# Patient Record
Sex: FEMALE | Race: OTHER | HISPANIC OR LATINO | Employment: FULL TIME | URBAN - METROPOLITAN AREA
[De-identification: names, ages, dates, MRNs, and addresses within clinical notes are randomized per-mention and may not be internally consistent; named-entity substitution may affect disease eponyms.]

---

## 2018-10-18 ENCOUNTER — EVALUATION (OUTPATIENT)
Dept: PHYSICAL THERAPY | Facility: CLINIC | Age: 43
End: 2018-10-18
Payer: COMMERCIAL

## 2018-10-18 ENCOUNTER — TRANSCRIBE ORDERS (OUTPATIENT)
Dept: PHYSICAL THERAPY | Facility: CLINIC | Age: 43
End: 2018-10-18

## 2018-10-18 DIAGNOSIS — R60.9 PITTING EDEMA: ICD-10-CM

## 2018-10-18 DIAGNOSIS — S83.512D RUPTURE OF ANTERIOR CRUCIATE LIGAMENT OF LEFT KNEE, SUBSEQUENT ENCOUNTER: Primary | ICD-10-CM

## 2018-10-18 PROCEDURE — 97110 THERAPEUTIC EXERCISES: CPT | Performed by: PHYSICAL THERAPIST

## 2018-10-18 PROCEDURE — G8991 OTHER PT/OT GOAL STATUS: HCPCS | Performed by: PHYSICAL THERAPIST

## 2018-10-18 PROCEDURE — 97014 ELECTRIC STIMULATION THERAPY: CPT | Performed by: PHYSICAL THERAPIST

## 2018-10-18 PROCEDURE — G0283 ELEC STIM OTHER THAN WOUND: HCPCS | Performed by: PHYSICAL THERAPIST

## 2018-10-18 PROCEDURE — G8990 OTHER PT/OT CURRENT STATUS: HCPCS | Performed by: PHYSICAL THERAPIST

## 2018-10-18 PROCEDURE — 97161 PT EVAL LOW COMPLEX 20 MIN: CPT | Performed by: PHYSICAL THERAPIST

## 2018-10-18 RX ORDER — CETIRIZINE HYDROCHLORIDE 10 MG/1
10 TABLET, CHEWABLE ORAL AS NEEDED
COMMUNITY
End: 2022-01-28 | Stop reason: ALTCHOICE

## 2018-10-18 NOTE — PROGRESS NOTES
PT Evaluation     Today's date: 10/18/2018  Patient name: Bharat Muro  : 1975  MRN: [id number]  Referring provider: Sebastián Lu DO  Dx:   Encounter Diagnosis     ICD-10-CM    1  Rupture of anterior cruciate ligament of left knee, subsequent encounter S83 512D    2  Pitting edema R60 9        Start Time: 0700  Stop Time: 0800  Total time in clinic (min): 60 minutes    Assessment  Impairments: abnormal coordination, abnormal gait, abnormal muscle firing, abnormal or restricted ROM, abnormal movement, activity intolerance, impaired balance, impaired physical strength, lacks appropriate home exercise program, pain with function and weight-bearing intolerance    Assessment details: Patient is a 39 y o  female who presents with the above listed impairments  Patient would benefit from skilled PT services to address these impairments and to maximize function  Thank you for the referral     Understanding of Dx/Px/POC: good   Prognosis: good    Goals  Impairment Goals  - Decrease pain by 50% in 2 weeks  - Increase left knee ROM to WNL 2 weeks  - Increase left lower extremity strength golbally to 4/5 in 6 weeks  Functional Goals  - Return to Prior Level of Function in 8 weeks  - Will be able to ambulate without crutches with normalized gait pattern in 2 weeks    - Patient will be independent with HEP in 8 weeks    Plan  Patient would benefit from: skilled PT  Planned modality interventions: cryotherapy  Planned therapy interventions: joint mobilization, manual therapy, neuromuscular re-education, patient education, strengthening, stretching, therapeutic activities, therapeutic exercise, home exercise program, functional ROM exercises, Palma taping and postural training  Frequency: 2x week (2-3x week)  Duration in weeks: 8  Treatment plan discussed with: patient        Subjective Evaluation    History of Present Illness  Mechanism of injury: Patient reports on 10/2/18 she feel of a counter height surface and landed on her L knee  She noted a "tearing" sensation  She recently had an MRI which demonstrated an near complete ACL tear  Pt notes she is using crutches and has been NWB  She recent just started with PWB with B axillary crutches as she has been instructed by her ortho no avoid full WB until she has a brace as this could cause more damage  She has also been asked to have "ACL baseline testing"  Which includes isokinetic testing, KT 1000, and functional testing  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[address]  PLOF: Independent  Pain  Current pain ratin  At best pain ratin  At worst pain ratin  Location: L knee    Patient Goals  Patient goals for therapy: decreased pain, independence with ADLs/IADLs, return to sport/leisure activities, increased motion, increased strength, decreased edema and improved balance          Objective     Observations   Left Knee   Positive for edema and effusion  Tenderness   Left Knee   Tenderness in the ITB, lateral joint line, lateral retinaculum, LCL (distal), LCL (proximal), MCL (distal), MCL (proximal), medial joint line, patellar tendon and quadriceps tendon  Active Range of Motion   Left Knee   Flexion: 90 degrees   Extension: 0 degrees     Right Knee   Flexion: 138 degrees   Extension: 0 degrees     Passive Range of Motion   Left Knee   Flexion: 100 degrees   Extension: 0 degrees     Strength/Myotome Testing     Additional Strength Details  Fair quad set with a 10 degree quad lag noted with a SLR  Tests     Left Knee   Positive anterior Lachman  Additional Tests Details        Swelling     Left Knee Girth Measurement (cm)   Joint line: 39 cm  10 cm above joint line: 42 cm  10 cm below joint line: 35 cm    Right Knee Girth Measurement (cm)   Joint line: 39 cm  10 cm above joint line: 44 cm  10 cm below joint line: 35 cm    General Comments     Knee Comments  Neurovascular intact    Gait was antalgic in nature with B axillary crutches        Flowsheet Rows      Most Recent Value   PT/OT G-Codes   Current Score  28   Projected Score  58   FOTO information reviewed  Yes   Assessment Type  Evaluation   G code set  Other PT/OT Primary   Other PT Primary Current Status ()  CL   Other PT Primary Goal Status ()  CH          Daily Treatment Diary   Diagnosis: L ACL tear   Precautions: NA   Manuals 10/17       PROM                                Exercise Diary        RBC                QS :05x10       SLR flexion AAROM x5       SLR abd        SLR add        SLR ext        Knee flexion S :10x10       Sitting HS curls        HS S                Biodex weight shift        Calf S                                                           Gait             Modalities             CP and IFC PRN 10 min        Gameready

## 2018-10-18 NOTE — LETTER
2018    Porter Chandra DO  [address] [facility]    Patient: Hunter Sal   YOB: 1975   Date of Visit: 10/18/2018     Encounter Diagnosis     ICD-10-CM    1  Rupture of anterior cruciate ligament of left knee, subsequent encounter S83 512D    2  Pitting edema R60 9        Dear Dr Lesa Severs:    Please review the attached Plan of Care from [address] [place] [address]'s recent visit  Please verify that you agree therapy should continue by signing the attached document and sending it back to our office  If you have any questions or concerns, please don't hesitate to call  Sincerely,    Catia John, PT      Referring Provider:      I certify that I have read the below Plan of Care and certify the need for these services furnished under this plan of treatment while under my care  Porter Chandra   [address] [address]  VIA Facsimile: [phone]          PT Evaluation     Today's date: 10/18/2018  Patient name: Hunter Sal  : 1975  MRN: [id number]  Referring provider: Monica Feldman DO  Dx:   Encounter Diagnosis     ICD-10-CM    1  Rupture of anterior cruciate ligament of left knee, subsequent encounter S83 512D    2  Pitting edema R60 9        Start Time: 0700  Stop Time: 0800  Total time in clinic (min): 60 minutes    Assessment  Impairments: abnormal coordination, abnormal gait, abnormal muscle firing, abnormal or restricted ROM, abnormal movement, activity intolerance, impaired balance, impaired physical strength, lacks appropriate home exercise program, pain with function and weight-bearing intolerance    Assessment details: Patient is a 39 y o  female who presents with the above listed impairments  Patient would benefit from skilled PT services to address these impairments and to maximize function    Thank you for the referral     Understanding of Dx/Px/POC: good   Prognosis: good    Goals  Impairment Goals  - Decrease pain by 50% in 2 weeks  - Increase left knee ROM to WNL 2 weeks  - Increase left lower extremity strength golbally to 4/5 in 6 weeks  Functional Goals  - Return to Prior Level of Function in 8 weeks  - Will be able to ambulate without crutches with normalized gait pattern in 2 weeks  - Patient will be independent with HEP in 8 weeks    Plan  Patient would benefit from: skilled PT  Planned modality interventions: cryotherapy  Planned therapy interventions: joint mobilization, manual therapy, neuromuscular re-education, patient education, strengthening, stretching, therapeutic activities, therapeutic exercise, home exercise program, functional ROM exercises, Palma taping and postural training  Frequency: 2x week (2-3x week)  Duration in weeks: 8  Treatment plan discussed with: patient        Subjective Evaluation    History of Present Illness  Mechanism of injury: Patient reports on 10/2/18 she feel of a counter height surface and landed on her L knee  She noted a "tearing" sensation  She recently had an MRI which demonstrated an near complete ACL tear  Pt notes she is using crutches and has been NWB  She recent just started with PWB with B axillary crutches as she has been instructed by her ortho no avoid full WB until she has a brace as this could cause more damage  She has also been asked to have "ACL baseline testing"  Which includes isokinetic testing, KT 1000, and functional testing  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  Pain  Current pain ratin  At best pain ratin  At worst pain ratin  Location: L knee    Patient Goals  Patient goals for therapy: decreased pain, independence with ADLs/IADLs, return to sport/leisure activities, increased motion, increased strength, decreased edema and improved balance          Objective     Observations   Left Knee   Positive for edema and effusion       Tenderness   Left Knee   Tenderness in the ITB, lateral joint line, lateral retinaculum, LCL (distal), LCL (proximal), MCL (distal), MCL (proximal), medial joint line, patellar tendon and quadriceps tendon  Active Range of Motion   Left Knee   Flexion: 90 degrees   Extension: 0 degrees     Right Knee   Flexion: 138 degrees   Extension: 0 degrees     Passive Range of Motion   Left Knee   Flexion: 100 degrees   Extension: 0 degrees     Strength/Myotome Testing     Additional Strength Details  Fair quad set with a 10 degree quad lag noted with a SLR  Tests     Left Knee   Positive anterior Lachman  Additional Tests Details        Swelling     Left Knee Girth Measurement (cm)   Joint line: 39 cm  10 cm above joint line: 42 cm  10 cm below joint line: 35 cm    Right Knee Girth Measurement (cm)   Joint line: 39 cm  10 cm above joint line: 44 cm  10 cm below joint line: 35 cm    General Comments     Knee Comments  Neurovascular intact  Gait was antalgic in nature with B axillary crutches        Flowsheet Rows      Most Recent Value   PT/OT G-Codes   Current Score  28   Projected Score  58   FOTO information reviewed  Yes   Assessment Type  Evaluation   G code set  Other PT/OT Primary   Other PT Primary Current Status ()  CL   Other PT Primary Goal Status ()  CH          Daily Treatment Diary   Diagnosis: L ACL tear   Precautions: NA   Manuals 10/17       PROM                                Exercise Diary        RBC                QS :05x10       SLR flexion AAROM x5       SLR abd        SLR add        SLR ext        Knee flexion S :10x10       Sitting HS curls        HS S                Biodex weight shift        Calf S                                                           Gait             Modalities             CP and IFC PRN 10 min        Gameready

## 2018-10-22 ENCOUNTER — OFFICE VISIT (OUTPATIENT)
Dept: PHYSICAL THERAPY | Facility: CLINIC | Age: 43
End: 2018-10-22
Payer: COMMERCIAL

## 2018-10-22 DIAGNOSIS — S83.512D RUPTURE OF ANTERIOR CRUCIATE LIGAMENT OF LEFT KNEE, SUBSEQUENT ENCOUNTER: Primary | ICD-10-CM

## 2018-10-22 PROCEDURE — 97010 HOT OR COLD PACKS THERAPY: CPT

## 2018-10-22 PROCEDURE — 97110 THERAPEUTIC EXERCISES: CPT

## 2018-10-22 PROCEDURE — 97112 NEUROMUSCULAR REEDUCATION: CPT

## 2018-10-22 PROCEDURE — 97140 MANUAL THERAPY 1/> REGIONS: CPT

## 2018-10-22 NOTE — PROGRESS NOTES
Daily Note     Today's date: 10/22/2018  Patient name: Stefan Green  : 1975  MRN: [id number]  Referring provider: Madelin Opitz, DO  Dx:   Encounter Diagnosis     ICD-10-CM    1  Rupture of anterior cruciate ligament of left knee, subsequent encounter S83 512D                   Subjective: Patient reports 2/10 pain upon arrival  She states she has been attempting to ambulate at home and stair climb with bilateral hand rails  She states she has about 7-8/10 pain at the end of the day  She has been complaint with HEP  Objective: See treatment diary below  Diagnosis: L ACL tear   Precautions: NA   Manuals 10/17 10/22        PROM   KLS                                                    Exercise Diary             RBC   5 min ROM                       QS :05x10 :05x10          SLR flexion AAROM x5 AROM x10         SLR abd   10x          SLR add            SLR ext   10x          Knee flexion S :10x10 :10x10         Sitting HS curls             HS S   :20x5                       Biodex weight shift   3 min          Calf S                                                                      Gait   One rail 5 min         Modalities             CP and IFC PRN 10 min CP only 10 min          Gameready                                 Assessment: Initiated outlined program  Patient demosntrates improved quad set; difficulty maintain full extension due to weakness and pain  She was able to perform weight shifting on Biodex and hold for several seconds each time with equal WBing  Instructed on heel strike for proper gait  Assess nv  Updated HEP  Plan: Progress treatment as tolerated

## 2018-10-25 ENCOUNTER — OFFICE VISIT (OUTPATIENT)
Dept: PHYSICAL THERAPY | Facility: CLINIC | Age: 43
End: 2018-10-25
Payer: COMMERCIAL

## 2018-10-25 DIAGNOSIS — R60.9 PITTING EDEMA: ICD-10-CM

## 2018-10-25 DIAGNOSIS — S83.512D RUPTURE OF ANTERIOR CRUCIATE LIGAMENT OF LEFT KNEE, SUBSEQUENT ENCOUNTER: Primary | ICD-10-CM

## 2018-10-25 PROCEDURE — 97110 THERAPEUTIC EXERCISES: CPT | Performed by: PHYSICAL THERAPIST

## 2018-10-25 PROCEDURE — 97140 MANUAL THERAPY 1/> REGIONS: CPT | Performed by: PHYSICAL THERAPIST

## 2018-10-25 PROCEDURE — 97112 NEUROMUSCULAR REEDUCATION: CPT | Performed by: PHYSICAL THERAPIST

## 2018-10-29 ENCOUNTER — OFFICE VISIT (OUTPATIENT)
Dept: PHYSICAL THERAPY | Facility: CLINIC | Age: 43
End: 2018-10-29
Payer: COMMERCIAL

## 2018-10-29 DIAGNOSIS — S83.512D RUPTURE OF ANTERIOR CRUCIATE LIGAMENT OF LEFT KNEE, SUBSEQUENT ENCOUNTER: Primary | ICD-10-CM

## 2018-10-29 DIAGNOSIS — R60.9 PITTING EDEMA: ICD-10-CM

## 2018-10-29 PROCEDURE — 97110 THERAPEUTIC EXERCISES: CPT | Performed by: PHYSICAL THERAPIST

## 2018-10-29 PROCEDURE — 97112 NEUROMUSCULAR REEDUCATION: CPT | Performed by: PHYSICAL THERAPIST

## 2018-10-29 NOTE — PROGRESS NOTES
Daily Note     Today's date: 10/29/2018  Patient name: Florentino Jamil  : 1975  MRN: [id number]  Referring provider: Maribeth Her DO  Dx:   Encounter Diagnosis     ICD-10-CM    1  Rupture of anterior cruciate ligament of left knee, subsequent encounter S83 512D    2  Pitting edema R60 9                   Subjective: Pt reports a 4/10 pain today  He notes this pain is consistent at the L knee  Objective: See treatment diary below  Diagnosis: L ACL tear   Precautions: NA   Manuals 10/17 10/22 10/25  10/29     PROM   KLS   CMF                                    PF taping     CMF  CMF     Exercise Diary             RBC   5 min ROM  5 min   5 min                    QS :05x10 :05x10   :05x10  HEP     SLR flexion AAROM x5 AROM x10  2x10 0#  0# 2x10     SLR abd   10x   0# 2x10  0# 2x10     SLR add     0# 2x10  0# 2x10     SLR ext   10x   0# 2x10  0# 2x10     Knee flexion S :10x10 :10x10  :10x10  HEP     Sitting HS curls      red 2x10  red 2x10     HS S   :20x5  :20x5  :20x5      Heel riases       2x10     Biodex weight shift   3 min   3 min  L12  3 min      Calf S      :20x5  :20x5                    Leg Press     10# 2x5  1x2  15# 2x10      step ups     4'' 1x10  4'' 2x10                    Gait   One rail 5 min  1 rail 5 min   100' no crutch with CMF     Modalities             CP and IFC PRN 10 min CP only 10 min  Pt def         Gameready                                 Assessment: Improved quad control noted with exercises today  Plan: Progress treatment as tolerated

## 2018-10-30 ENCOUNTER — OFFICE VISIT (OUTPATIENT)
Dept: PHYSICAL THERAPY | Facility: OTHER | Age: 43
End: 2018-10-30
Payer: COMMERCIAL

## 2018-10-30 DIAGNOSIS — S83.512D RUPTURE OF ANTERIOR CRUCIATE LIGAMENT OF LEFT KNEE, SUBSEQUENT ENCOUNTER: Primary | ICD-10-CM

## 2018-10-30 PROCEDURE — 97110 THERAPEUTIC EXERCISES: CPT | Performed by: PHYSICAL THERAPIST

## 2018-10-30 NOTE — PROGRESS NOTES
Daily Note     Today's date: 10/30/2018  Patient name: Pricilla Norton  : 1975  MRN: [id number]  Referring provider: Amor Palomo DO  Dx:   Encounter Diagnosis     ICD-10-CM    1  Rupture of anterior cruciate ligament of left knee, subsequent encounter S83 512D                   Subjective: only performed KT 1000 testing  At 15Lbs there was +3mm difference involved verses uninvolved  At 20lbs there was a +5mm difference involved verses uninvolved   Unable to tolerate >20lbs of force therefore the test was stopped  At 20lbs having the +5mm L verses R is indicative of laxity of the injured knee   Time was spent discussing the test and and purpose  Objective: See treatment diary below      Assessment: Tolerated treatment fair  Patient would benefit from continued PT      Plan: Continue per plan of care

## 2018-10-30 NOTE — LETTER
2018    Nany Cedillo DO  [address]    Patient: Jolene Adler   YOB: 1975   Date of Visit: 10/30/2018     Encounter Diagnosis     ICD-10-CM    1  Rupture of anterior cruciate ligament of left knee, subsequent encounter S83 512D        Dear Dr Meghan White:    Please review the attached KT 1000 test for Chon Jensen  If you have any questions or concerns, please don't hesitate to call  Sincerely,    Valentino Senna, PT, DPT, SCS                          Nany Cedillo DO  [address] [facility] [address]  VIA Facsimile: [phone]          Daily Note     Today's date: 10/30/2018  Patient name: Jolene Adler  : 1975  MRN: [id number]  Referring provider: Mary Pabon DO  Dx:   Encounter Diagnosis     ICD-10-CM    1  Rupture of anterior cruciate ligament of left knee, subsequent encounter S83 512D                   Subjective: only performed KT 1000 testing  At 15Lbs there was +3mm difference involved verses uninvolved  At 20lbs there was a +5mm difference involved verses uninvolved   Unable to tolerate >20lbs of force therefore the test was stopped  At 20lbs having the +5mm L verses R is indicative of laxity of the injured knee   Time was spent discussing the test and and purpose  Objective: See treatment diary below      Assessment: Tolerated treatment fair  Patient would benefit from continued PT      Plan: Continue per plan of care

## 2018-10-31 PROCEDURE — G8990 OTHER PT/OT CURRENT STATUS: HCPCS | Performed by: PHYSICAL THERAPIST

## 2018-10-31 PROCEDURE — G8991 OTHER PT/OT GOAL STATUS: HCPCS | Performed by: PHYSICAL THERAPIST

## 2018-11-01 ENCOUNTER — OFFICE VISIT (OUTPATIENT)
Dept: PHYSICAL THERAPY | Facility: CLINIC | Age: 43
End: 2018-11-01
Payer: COMMERCIAL

## 2018-11-01 DIAGNOSIS — S83.512D RUPTURE OF ANTERIOR CRUCIATE LIGAMENT OF LEFT KNEE, SUBSEQUENT ENCOUNTER: Primary | ICD-10-CM

## 2018-11-01 PROCEDURE — 97110 THERAPEUTIC EXERCISES: CPT

## 2018-11-01 PROCEDURE — 97112 NEUROMUSCULAR REEDUCATION: CPT

## 2018-11-01 NOTE — PROGRESS NOTES
Daily Note     Today's date: 2018  Patient name: Lilian Blake  : 1975  MRN: [id number]  Referring provider: Merleen Jeans, DO  Dx:   Encounter Diagnosis     ICD-10-CM    1  Rupture of anterior cruciate ligament of left knee, subsequent encounter S86 512D                   Subjective: Patient states she has no pain when arriving to therapy  She reports she has been attempting to ambulate less with AD  She states her pain symptoms are unpredictable and she has difficulty maintaining equal WBing on left  Objective: See treatment diary below  Diagnosis: L ACL tear   Precautions: NA   Manuals 10/17 10/22 10/25  10/29 11/1   PROM   KLS   CMF                                   PF taping     CMF  CMF KLS   Exercise Diary             RBC   5 min ROM  5 min   5 min  5 min                  QS :05x10 :05x10   :05x10  HEP     SLR flexion AAROM x5 AROM x10  2x10 0#  0# 2x10 0# 3x10   SLR abd   10x   0# 2x10  0# 2x10 0# 3x10   SLR add      0# 2x10  0# 2x10 0# 3x10   SLR ext   10x   0# 2x10  0# 2x10 0# 3x10   Knee flexion S :10x10 :10x10  :10x10  HEP     Sitting HS curls      red 2x10  red 2x10 Red 2x10   HS S   :20x5  :20x5  :20x5 :20x5    Heel riases       2x10 2x10    Biodex weight shift   3 min   3 min  L12  3 min  3 min    Calf S      :20x5  :20x5 :20x5                  Leg Press     10# 2x5  1x2  15# 2x10 20# 2x10    step ups     4'' 1x10  4'' 2x10 6 in 2x10                   Gait   One rail 5 min  1 rail 5 min   100' no crutch with CMF Around clinic with no crutch   Modalities             CP and IFC PRN 10 min CP only 10 min  Pt def    5 min CP    Gameready                                 Assessment: Continued with outlined program  Patient demonstrates improved tolerance to quad activation, however unable to fully extend without pain  Updated HEP  Plan: Progress treatment as tolerated

## 2018-11-05 ENCOUNTER — OFFICE VISIT (OUTPATIENT)
Dept: PHYSICAL THERAPY | Facility: CLINIC | Age: 43
End: 2018-11-05
Payer: COMMERCIAL

## 2018-11-05 DIAGNOSIS — S83.512D RUPTURE OF ANTERIOR CRUCIATE LIGAMENT OF LEFT KNEE, SUBSEQUENT ENCOUNTER: Primary | ICD-10-CM

## 2018-11-05 PROCEDURE — 97110 THERAPEUTIC EXERCISES: CPT | Performed by: PHYSICAL THERAPIST

## 2018-11-05 PROCEDURE — 97112 NEUROMUSCULAR REEDUCATION: CPT | Performed by: PHYSICAL THERAPIST

## 2018-11-05 NOTE — PROGRESS NOTES
Daily Note     Today's date: 2018  Patient name: Glen Roca  : 1975  MRN: [id number]  Referring provider: Kimmy Liang DO  Dx:   Encounter Diagnosis     ICD-10-CM    1  Rupture of anterior cruciate ligament of left knee, subsequent encounter S83 512D                   Subjective: Pt present to PT today without crutches  Objective: See treatment diary below  Diagnosis: L ACL tear   Precautions: NA   Manuals 10/17 10/22 10/25  10/29  11/5   PROM   KLS   CMF    CMF                                PF taping     CMF  CMF Patella tendon unload with KT tape   Exercise Diary             RBC   5 min ROM  5 min   5 min   5 min                  QS :05x10 :05x10   :05x10  HEP  -   SLR flexion AAROM x5 AROM x10  2x10 0#  0# 2x10  1# 3x10   SLR abd   10x   0# 2x10  0# 2x10  1 5# 3x10   SLR add     0# 2x10  0# 2x10   1 5# 3x10   SLR ext   10x   0# 2x10  0# 2x10   1 5# 3x10   Knee flexion S :10x10 :10x10  :10x10  HEP  HEP   Sitting HS curls      red 2x10  red 2x10  red 3x10   HS S   :20x5  :20x5  :20x5  :20x5    Heel riases       2x10  2x10   Biodex weight shift   3 min   3 min  L12  3 min   3 min LOS   Calf S      :20x5  :20x5  :20x5                  Leg Press     10# 2x5  1x2  15# 2x10  25#2x10    step ups     4'' 1x10  4'' 2x10  8'' 3x10                  Gait   One rail 5 min  1 rail 5 min   100' no crutch with CMF     Modalities             CP and IFC PRN 10 min CP only 10 min  Pt def     CP 3 min     Gameready                                 Assessment: Continued improved quad control  Was able to progress with moderate fatigue noted  Plan: Progress treatment as tolerated

## 2018-11-08 ENCOUNTER — OFFICE VISIT (OUTPATIENT)
Dept: PHYSICAL THERAPY | Facility: CLINIC | Age: 43
End: 2018-11-08
Payer: COMMERCIAL

## 2018-11-08 DIAGNOSIS — S83.512D RUPTURE OF ANTERIOR CRUCIATE LIGAMENT OF LEFT KNEE, SUBSEQUENT ENCOUNTER: Primary | ICD-10-CM

## 2018-11-08 PROCEDURE — 97140 MANUAL THERAPY 1/> REGIONS: CPT

## 2018-11-08 PROCEDURE — 97110 THERAPEUTIC EXERCISES: CPT

## 2018-11-08 PROCEDURE — 97010 HOT OR COLD PACKS THERAPY: CPT

## 2018-11-08 PROCEDURE — 97112 NEUROMUSCULAR REEDUCATION: CPT

## 2018-11-08 NOTE — PROGRESS NOTES
Daily Note     Today's date: 2018  Patient name: Bren Cardoza  : 1975  MRN: [id number]  Referring provider: Moshe Islas DO  Dx:   Encounter Diagnosis     ICD-10-CM    1  Rupture of anterior cruciate ligament of left knee, subsequent encounter S83 512D                   Subjective: Patient states she has been stair climbing more while at home which she feels has increased her pain symptoms  She has constant pain about a 3-4/10  She reports she will be fitted her brace tonight  She states she has popping and catching with flexion to extension positional changes  She states she has been using the crutch for distance  Objective: See treatment diary below  Diagnosis: L ACL tear   Precautions: NA   Manuals 11/8  10/25  10/29  11/5   PROM     CMF    CMF                             PF taping KLS patella off loading  CMF  CMF Patella tendon unload with KT tape   Exercise Diary            RBC 5 min    5 min   5 min   5 min                 QS :05x10   :05x10  HEP  -   SLR flexion 1 5# 3x10   2x10 0#  0# 2x10  1# 3x10   SLR abd 1 5# 3x10   0# 2x10  0# 2x10  1 5# 3x10   SLR add 1 5# 3x10   0# 2x10  0# 2x10   1 5# 3x10   SLR ext 1 5# 3x10   0# 2x10  0# 2x10   1 5# 3x10   Knee flexion S :10x10   :10x10  HEP  HEP   Sitting HS curls GTB 2x10   red 2x10  red 2x10  red 3x10   HS S :20x5   :20x5  :20x5  :20x5    Heel riases 3x10    2x10  2x10   Biodex weight shift 3x LOS    3 min  L12  3 min   3 min LOS   Calf S :20x5    :20x5  :20x5  :20x5   Clamshells RTB 3x10           Leg Press 25# 2x10 1x7   10# 2x5  1x2  15# 2x10  25#2x10    step ups   4'' 1x10  4'' 2x10  8'' 3x10                 Gait No crutch while in PT   1 rail 5 min   100' no crutch with CMF     Modalities            CP and IFC PRN 10 min   Pt def     CP 3 min     Gameready                               Assessment: Continued with outlined program  Noted fatigue and weakness with progressions  Updated HEP  Pain with strengthening        Plan: Progress treatment as tolerated

## 2018-11-12 ENCOUNTER — OFFICE VISIT (OUTPATIENT)
Dept: PHYSICAL THERAPY | Facility: CLINIC | Age: 43
End: 2018-11-12
Payer: COMMERCIAL

## 2018-11-12 DIAGNOSIS — S83.512D RUPTURE OF ANTERIOR CRUCIATE LIGAMENT OF LEFT KNEE, SUBSEQUENT ENCOUNTER: Primary | ICD-10-CM

## 2018-11-12 PROCEDURE — 97112 NEUROMUSCULAR REEDUCATION: CPT | Performed by: PHYSICAL THERAPIST

## 2018-11-12 PROCEDURE — 97110 THERAPEUTIC EXERCISES: CPT | Performed by: PHYSICAL THERAPIST

## 2018-11-12 NOTE — PROGRESS NOTES
Daily Note     Today's date: 2018  Patient name: Lucia Joyce  : 1975  MRN: [id number]  Referring provider: Yazmin Campbell DO  Dx:   Encounter Diagnosis     ICD-10-CM    1  Rupture of anterior cruciate ligament of left knee, subsequent encounter S83 512D                   Subjective: Patient reports a constant 3/10 L knee pain around her knee cap  She notes her pain at the worse her pain is a 5-6/10  Objective: See treatment diary below  Diagnosis: L ACL tear   Precautions: NA   Manuals 11/8 11/12 10/25  10/29  11/5   PROM     CMF    CMF                             PF taping KLS patella off loading CMF CMF  CMF Patella tendon unload with KT tape   Exercise Diary            RBC 5 min  5 min   5 min   5 min   5 min                 QS :05x10 D/c  :05x10  HEP  -   SLR flexion 1 5# 3x10 1 5# 3x10  2x10 0#  0# 2x10  1# 3x10   SLR abd 1 5# 3x10 1 5# 3x10  0# 2x10  0# 2x10  1 5# 3x10   SLR add 1 5# 3x10 1 5# 3x10  0# 2x10  0# 2x10   1 5# 3x10   SLR ext 1 5# 3x10 1 5# 3x10  0# 2x10  0# 2x10   1 5# 3x10   Knee flexion S :10x10 HEP  :10x10  HEP  HEP   Sitting HS curls GTB 2x10   red 2x10  red 2x10  red 3x10   HS S :20x5 :20x5  :20x5  :20x5  :20x5    Heel riases 3x10 2x15 focus on L   2x10  2x10   Biodex weight shift 3x LOS  D/c  3 min  L12  3 min   3 min LOS   Calf S :20x5  :20x5  :20x5  :20x5  :20x5   Clamshells RTB 3x10 RTB 3x10          Leg Press 25# 2x10 1x7  25# 3x10 10# 2x5  1x2  15# 2x10  25#2x10    step ups  6'' 2x10 4'' 1x10  4'' 2x10  8'' 3x10    wall sit   :15x4         SLS  :30x4      SL bridge  x10                       Gait No crutch while in PT   1 rail 5 min   100' no crutch with CMF     Modalities            CP and IFC PRN 10 min 10 min  Pt def     CP 3 min     Gameready                               Assessment: Was able to progress with exercise program   Improvement noted with neuromuscular control  Plan: Progress treatment as tolerated

## 2018-11-15 ENCOUNTER — OFFICE VISIT (OUTPATIENT)
Dept: PHYSICAL THERAPY | Facility: CLINIC | Age: 43
End: 2018-11-15
Payer: COMMERCIAL

## 2018-11-15 DIAGNOSIS — S83.512D RUPTURE OF ANTERIOR CRUCIATE LIGAMENT OF LEFT KNEE, SUBSEQUENT ENCOUNTER: Primary | ICD-10-CM

## 2018-11-15 DIAGNOSIS — R60.9 PITTING EDEMA: ICD-10-CM

## 2018-11-15 PROCEDURE — 97110 THERAPEUTIC EXERCISES: CPT | Performed by: PHYSICAL THERAPIST

## 2018-11-15 PROCEDURE — 97112 NEUROMUSCULAR REEDUCATION: CPT | Performed by: PHYSICAL THERAPIST

## 2018-11-15 NOTE — PROGRESS NOTES
Daily Note     Today's date: 11/15/2018  Patient name: Isaias Huddleston  : 1975  MRN: [id number]  Referring provider: Gonzalo Monroy DO  Dx:   Encounter Diagnosis     ICD-10-CM    1  Rupture of anterior cruciate ligament of left knee, subsequent encounter S83 512D    2  Pitting edema R60 9                   Subjective: Patient states she has noticed reduction in pain symptoms since beginning therapy, but still has consistent pain and popping  She notes she was fitted for her brace and has been compliant at all times wearing this as instructed by ortho  Objective: See treatment diary below  Diagnosis: L ACL tear   Precautions: NA   Manuals 11/8 11/12 11/15   11/5   PROM     KLS   CMF                             PF taping KLS patella off loading CMF KLS  Patella tendon unload with KT tape   Exercise Diary            RBC 5 min  5 min   5 min    5 min                 QS :05x10 D/c  :05x10   -   SLR flexion 1 5# 3x10 1 5# 3x10 1 5# 3x10   1# 3x10   SLR abd 1 5# 3x10 1 5# 3x10 1 5# 2x10   1 5# 3x10   SLR add 1 5# 3x10 1 5# 3x10 1 5# 2x10    1 5# 3x10   SLR ext 1 5# 3x10 1 5# 3x10 1 5# 2x10    1 5# 3x10   Knee flexion S :10x10 HEP  :10x10   HEP   Sitting HS curls GTB 2x10    red 2x10   red 3x10   HS S :20x5 :20x5  :20x5   :20x5    Heel riases 3x10 2x15 focus on L 2x15 focus L   2x10   Biodex weight shift 3x LOS  D/c    3 min LOS   Calf S :20x5  :20x5  :20x5   :20x5   Clamshells RTB 3x10 RTB 3x10 GTB 3x10        Leg Press 25# 2x10 1x7  25# 3x10 10# 2x5  1x2   25#2x10    step ups   6'' 2x10 8'' 1x10   8'' 3x10    wall sit   :15x4 :15x4      SLS   :30x4 :30x4      SL bridge   x10 x15                                 Gait No crutch while in PT   No crutch while in PT      Modalities            CP and IFC PRN 10 min 10 min def   CP 3 min     Gameready                                 Assessment: Continued with outlined program  Patient demonstrates improved stability  Improving LE strength         Plan: Progress treatment as tolerated

## 2018-11-19 ENCOUNTER — EVALUATION (OUTPATIENT)
Dept: PHYSICAL THERAPY | Facility: CLINIC | Age: 43
End: 2018-11-19
Payer: COMMERCIAL

## 2018-11-19 DIAGNOSIS — S83.512D RUPTURE OF ANTERIOR CRUCIATE LIGAMENT OF LEFT KNEE, SUBSEQUENT ENCOUNTER: Primary | ICD-10-CM

## 2018-11-19 DIAGNOSIS — R60.9 PITTING EDEMA: ICD-10-CM

## 2018-11-19 PROCEDURE — 97112 NEUROMUSCULAR REEDUCATION: CPT | Performed by: PHYSICAL THERAPIST

## 2018-11-19 PROCEDURE — 97110 THERAPEUTIC EXERCISES: CPT | Performed by: PHYSICAL THERAPIST

## 2018-11-19 PROCEDURE — 97140 MANUAL THERAPY 1/> REGIONS: CPT | Performed by: PHYSICAL THERAPIST

## 2018-11-19 NOTE — PROGRESS NOTES
Daily Note     Today's date: 2018  Patient name: Calos Vazquez  : 1975  MRN: [id number]  Referring provider: Wiliam Hoang DO  Dx:   Encounter Diagnosis     ICD-10-CM    1  Rupture of anterior cruciate ligament of left knee, subsequent encounter S83 512D    2  Pitting edema R60 9                   Subjective: Patient noted 2-3/10 pain that is consistent at the L knee  She still notes "popping'' at the L knee which is painful  Objective: See treatment diary below  Diagnosis: L ACL tear   Precautions: NA   Manuals 11/8 11/12 11/15 11/19/18  11/5   PROM     KLS CMF  CMF                 IASTM L distal ITB       CMF     PF taping KLS patella off loading CMF KLS CMF Patella tendon unload with KT tape   Exercise Diary            RBC 5 min  5 min   5 min  5 min   5 min                 QS :05x10 D/c  :05x10 D/c  -   SLR flexion 1 5# 3x10 1 5# 3x10 1 5# 3x10 2# 3x10   1# 3x10   SLR abd 1 5# 3x10 1 5# 3x10 1 5# 2x10 2# 3x10  1 5# 3x10   SLR add 1 5# 3x10 1 5# 3x10 1 5# 2x10 2# 3x10   1 5# 3x10   SLR ext 1 5# 3x10 1 5# 3x10 1 5# 2x10 2# 3x10   1 5# 3x10   Knee flexion S :10x10 HEP  :10x10 D/c  HEP   Sitting HS curls GTB 2x10    red 2x10   red 3x10   HS S :20x5 :20x5  :20x5 :20x5  :20x5    Heel riases 3x10 2x15 focus on L 2x15 focus L 2x15  2x10   Biodex weight shift 3x LOS  D/c    3 min LOS   Calf S :20x5  :20x5  :20x5 :20x5  :20x5   Clamshells RTB 3x10 RTB 3x10 GTB 3x10  BTB 3x10      Leg Press 25# 2x10 1x7  25# 3x10 10# 2x5  1x2 25# 2x10  25#2x10    step ups   6'' 2x10 8'' 1x10 8'' 1x10  8'' 3x10    wall sit   :15x4 :15x4 :20x4     SLS   :30x4 :30x4 :30x3     SL bridge   x10 x15 2x10      x-walks       Green 2x10                   Gait No crutch while in PT   No crutch while in PT      Modalities            CP and IFC PRN 10 min 10 min def CP 4 min   CP 3 min     Gameready                                 Assessment: Continued with outlined program  Patient demonstrates improved stability  Improving LE strength  Plan: Progress treatment as tolerated

## 2018-11-26 ENCOUNTER — OFFICE VISIT (OUTPATIENT)
Dept: PHYSICAL THERAPY | Facility: CLINIC | Age: 43
End: 2018-11-26
Payer: COMMERCIAL

## 2018-11-26 DIAGNOSIS — S83.512D RUPTURE OF ANTERIOR CRUCIATE LIGAMENT OF LEFT KNEE, SUBSEQUENT ENCOUNTER: ICD-10-CM

## 2018-11-26 DIAGNOSIS — R60.9 PITTING EDEMA: Primary | ICD-10-CM

## 2018-11-26 PROCEDURE — 97110 THERAPEUTIC EXERCISES: CPT | Performed by: PHYSICAL THERAPIST

## 2018-11-26 PROCEDURE — 97112 NEUROMUSCULAR REEDUCATION: CPT | Performed by: PHYSICAL THERAPIST

## 2018-11-26 NOTE — PROGRESS NOTES
Daily Note     Today's date: 2018  Patient name: Leti Paz  : 1975  MRN: [id number]  Referring provider: Rhonda Flores DO  Dx:   Encounter Diagnosis     ICD-10-CM    1  Pitting edema R60 9    2  Rupture of anterior cruciate ligament of left knee, subsequent encounter S81 512D                   Subjective: Pt notes 2/10 consistent pain  Still notes some difficulty with lateral movements  Objective: See treatment diary below  Diagnosis: L ACL tear   Precautions: NA   Manuals 11/8 11/12 11/15 11/19/18 11/26   PROM     KLS CMF                 IASTM L distal ITB       CMF    PF taping KLS patella off loading CMF KLS CMF Pt def despite educuation provided   Exercise Diary           RBC 5 min  5 min   5 min  5 min  5 min                QS :05x10 D/c  :05x10 D/c -   SLR flexion 1 5# 3x10 1 5# 3x10 1 5# 3x10 2# 3x10  D/c    SLR abd 1 5# 3x10 1 5# 3x10 1 5# 2x10 2# 3x10 D/c   SLR add 1 5# 3x10 1 5# 3x10 1 5# 2x10 2# 3x10 D/c   SLR ext 1 5# 3x10 1 5# 3x10 1 5# 2x10 2# 3x10 D/c   Knee flexion S :10x10 HEP  :10x10 D/c -   Sitting HS curls GTB 2x10    red 2x10     HS S :20x5 :20x5  :20x5 :20x5 :20x5    Heel riases 3x10 2x15 focus on L 2x15 focus L 2x15 2x15   Biodex weight shift 3x LOS  D/c      Calf S :20x5  :20x5  :20x5 :20x5 HEP   Clamshells RTB 3x10 RTB 3x10 GTB 3x10  BTB 3x10 BTB 3x10    Leg Press 25# 2x10 1x7  25# 3x10 10# 2x5  1x2 25# 2x10 25# 2x10    step ups   6'' 2x10 8'' 1x10 8'' 1x10 8'' 2x10    wall sit   :15x4 :15x4 :20x4 :30x2, :25x2   SLS   :30x4 :30x4 :30x3 rebounder green x30   SL bridge   x10 x15 2x10 2x10    x-walks       Green 2x10 Blue :20'x6    SLS star exercise        x3 with guarding    Gait No crutch while in PT   No crutch while in PT     Modalities           CP and IFC PRN 10 min 10 min def CP 4 min  CP 10 min     Gameready                                Assessment: Tolerated treatment well  Patient demonstrated fatigue post treatment    Pt does demonstrate pain with progression of exercise program       Plan: Continue per plan of care

## 2018-11-27 ENCOUNTER — OFFICE VISIT (OUTPATIENT)
Dept: OBGYN CLINIC | Facility: OTHER | Age: 43
End: 2018-11-27
Payer: COMMERCIAL

## 2018-11-27 VITALS
BODY MASS INDEX: 25.16 KG/M2 | SYSTOLIC BLOOD PRESSURE: 122 MMHG | DIASTOLIC BLOOD PRESSURE: 79 MMHG | WEIGHT: 166 LBS | HEIGHT: 68 IN | HEART RATE: 96 BPM

## 2018-11-27 DIAGNOSIS — S83.512A RUPTURE OF ANTERIOR CRUCIATE LIGAMENT OF LEFT KNEE, INITIAL ENCOUNTER: Primary | ICD-10-CM

## 2018-11-27 PROCEDURE — 99203 OFFICE O/P NEW LOW 30 MIN: CPT | Performed by: ORTHOPAEDIC SURGERY

## 2018-11-27 RX ORDER — MULTIVITAMIN
CAPSULE ORAL
COMMUNITY
End: 2022-01-28 | Stop reason: ALTCHOICE

## 2018-11-27 NOTE — PROGRESS NOTES
Assessment  Diagnoses and all orders for this visit:    Rupture of anterior cruciate ligament of left knee, initial encounter        Discussion and Plan:  Surgical intervention is not recommended at this time as the patient has shown improvement with PT and an ACL brace  Recommend continued conservative treatment at this time for a total of 3-4 mos of PT  Should patient's symptoms fail to continue to improve surgical treatment could be discussed at that time  F/U 6-8 wks      Subjective:   Patient ID: Lilian Blake is a 39 y o  female      HPI  This is a 39 yr old female here today for initial evaluation of left knee pain  Patient states 10/02/18 she was stepping off of a 2 foot elevation when she twisted the knee  Patient was seen and evaluated by Dr Eugenia Paula  MRI was done which showed a left knee partial ACL tear  Dr Eugenia Paula ordered PT and an ACL brace  Patient has been attending PT for 4 wks and notes improvement  She has had her brace for 2 wks and wears it as instructed  Patient is here today as her therapist told her it should not be taking this long to improve  Patient complains of anterior and lateral knee pain  Pain increases with WB activities and is relieved by rest  Patient describes continued episodes of instability  Patient denies any numbness, tingling, fever or chills  Patient would like to return to walking, hiking, rock climbing, and dancing  The following portions of the patient's history were reviewed and updated as appropriate: allergies, current medications, past family history, past medical history, past social history, past surgical history and problem list     Review of Systems   Constitutional: Negative for chills and fever  HENT: Negative for drooling and sneezing  Eyes: Negative for redness  Respiratory: Negative for cough and wheezing  Gastrointestinal: Negative for nausea and vomiting     Psychiatric/Behavioral: Negative for behavioral problems  The patient is not nervous/anxious  Objective:  Left Knee Exam   Swelling: None  Effusion: No    Tenderness   The patient is experiencing tenderness in the medial joint line, lateral joint line, LFC  Range of Motion   Extension: 0  Flexion:     140    Comments:    No erythema, ecchymosis, or effusion  NVI on exam today  Positive Lachman's  Negative dial test          Physical Exam   Constitutional: She is oriented to person, place, and time  She appears well-developed and well-nourished  Eyes: Pupils are equal, round, and reactive to light  Pulmonary/Chest: Effort normal and breath sounds normal    Musculoskeletal:        Left knee: Medial joint line and lateral joint line tenderness noted  Neurological: She is alert and oriented to person, place, and time  Skin: Skin is warm and dry  Psychiatric: She has a normal mood and affect  Her behavior is normal  Judgment and thought content normal          I have personally reviewed pertinent films in PACS and my interpretation is as follows  Left knee MRI: partial ACL tear, with bone contusion    Scribe Attestation    I,:   Deirdre Contreras am acting as a scribe while in the presence of the attending physician :        I,:   Brooke Johns MD personally performed the services described in this documentation    as scribed in my presence :            The patient has an acute ACL rupture which is being rehabilitated currently, her examination and clinical symptoms do not indicate surgery at this point but if her clinical symptoms failed to improve with further rehab and she develops functional instability by the time we re-evaluate her at the next visit then we should consider arthroscopic ACL reconstruction of the left knee    We will re-evaluate her in 2 months or sooner if necessary

## 2018-11-29 ENCOUNTER — OFFICE VISIT (OUTPATIENT)
Dept: PHYSICAL THERAPY | Facility: CLINIC | Age: 43
End: 2018-11-29
Payer: COMMERCIAL

## 2018-11-29 DIAGNOSIS — S83.512D RUPTURE OF ANTERIOR CRUCIATE LIGAMENT OF LEFT KNEE, SUBSEQUENT ENCOUNTER: ICD-10-CM

## 2018-11-29 DIAGNOSIS — R60.9 PITTING EDEMA: Primary | ICD-10-CM

## 2018-11-29 PROCEDURE — G8990 OTHER PT/OT CURRENT STATUS: HCPCS

## 2018-11-29 PROCEDURE — 97110 THERAPEUTIC EXERCISES: CPT

## 2018-11-29 PROCEDURE — G8991 OTHER PT/OT GOAL STATUS: HCPCS

## 2018-11-29 PROCEDURE — 97112 NEUROMUSCULAR REEDUCATION: CPT

## 2018-11-29 PROCEDURE — 97140 MANUAL THERAPY 1/> REGIONS: CPT

## 2018-11-29 NOTE — PROGRESS NOTES
Daily Note     Today's date: 2018  Patient name: Dannie Eaton  : 1975  MRN: [id number]  Referring provider: Kristian Coker DO  Dx:   Encounter Diagnosis     ICD-10-CM    1  Pitting edema R60 9    2  Rupture of anterior cruciate ligament of left knee, subsequent encounter S80 104D                   Subjective: Patient states she feels good every morning, it is following a full day of work or activity which her symptoms are at their worst  She reports her pain is still presenting the same and she remains frustrated  She reports increase swelling in her L foot, she states elevating her leg does not help with swelling  Objective: See treatment diary below  Diagnosis: L ACL tear   Precautions: NA   Manuals 11/29  11/15 11/19/18 11/26   PROM    KLS CMF                  IASTM L distal ITB      CMF     PF taping/PF offloading KLS  KLS CMF Pt def despite educuation provided   Exercise Diary            RBC 5 min    5 min  5 min  5 min                 HS S :20x5   :20x5 :20x5 :20x5   Heel raises 3x10 L   2x15 focus L 2x15 2x15   Biodex weight shift            Clamshells BTB 3x10  GTB 3x10  BTB 3x10 BTB 3x10   Leg Press 30# 3x10   10# 2x5  1x2 25# 2x10 25# 2x10   step ups 8in 3x10  8'' 1x10 8'' 1x10 8'' 2x10   wall sit :30x2, :10x1   :15x4 :20x4 :30x2, :25x2   SLS rebounder  Green 30x  :30x4 :30x3 green x30   SL bridge 2x10  x15 2x10 2x10   Bridge w/ HS curls 1x10        x-walks Blue 20'x6    Green 2x10 Blue :20'x6   SLS star exercise x3 standing       x3 with guarding   Prone HS curls Fast/slow 20x        Plank        Lateral plank        Lunge        SLS EC  :30x2                  Modalities            CP and IFC PRN 10 min  def CP 4 min  CP 10 min     Gameready                                 Assessment:Continued to progress outlined program  Patient demonstrates improved proprioception and quad strength; however pain persists and limits duration at this time   Patient agreeable to taping techniques for stability as she feels beneficial      Plan: Progress treatment as tolerated

## 2018-12-03 ENCOUNTER — EVALUATION (OUTPATIENT)
Dept: PHYSICAL THERAPY | Facility: CLINIC | Age: 43
End: 2018-12-03
Payer: COMMERCIAL

## 2018-12-03 DIAGNOSIS — R60.9 PITTING EDEMA: Primary | ICD-10-CM

## 2018-12-03 DIAGNOSIS — S83.512D RUPTURE OF ANTERIOR CRUCIATE LIGAMENT OF LEFT KNEE, SUBSEQUENT ENCOUNTER: ICD-10-CM

## 2018-12-03 PROCEDURE — G8991 OTHER PT/OT GOAL STATUS: HCPCS | Performed by: PHYSICAL THERAPIST

## 2018-12-03 PROCEDURE — 97112 NEUROMUSCULAR REEDUCATION: CPT | Performed by: PHYSICAL THERAPIST

## 2018-12-03 PROCEDURE — 97110 THERAPEUTIC EXERCISES: CPT | Performed by: PHYSICAL THERAPIST

## 2018-12-03 PROCEDURE — 97140 MANUAL THERAPY 1/> REGIONS: CPT | Performed by: PHYSICAL THERAPIST

## 2018-12-03 PROCEDURE — G8990 OTHER PT/OT CURRENT STATUS: HCPCS | Performed by: PHYSICAL THERAPIST

## 2018-12-03 NOTE — PROGRESS NOTES
PT Re-evaluation    Today's date: 12/3/2018  Patient name: Florentino Jamil  : 1975  MRN: [id number]  Referring provider: Maribeth Her DO  Dx:   Encounter Diagnosis     ICD-10-CM    1  Pitting edema R60 9    2  Rupture of anterior cruciate ligament of left knee, subsequent encounter S83 512D                   Assessment  Assessment details: Patient is a 39 y o  female who presents with the above listed impairments  Patient would continue to benefit from skilled PT services to address these impairments and to maximize function  Thank you for the referral     Impairments: abnormal coordination, abnormal gait, abnormal muscle firing, abnormal or restricted ROM, abnormal movement, activity intolerance, impaired balance, impaired physical strength, pain with function and weight-bearing intolerance  Understanding of Dx/Px/POC: good   Prognosis: good    Goals  Impairment Goals  - Decrease pain by 50% in 2 weeks  - not met  - Increase left knee ROM to WNL 2 weeks  - met  - Increase left lower extremity strength golbally to 4/5 in 6 weeks  - partially met    Functional Goals  - Return to Prior Level of Function in 8 weeks - partially met  - Will be able to ambulate without crutches with normalized gait pattern in 2 weeks  - partially met  - Patient will be independent with HEP in 8 weeks - not met    Plan  Patient would benefit from: skilled PT  Planned modality interventions: cryotherapy  Planned therapy interventions: joint mobilization, manual therapy, neuromuscular re-education, patient education, strengthening, stretching, therapeutic activities, therapeutic exercise, home exercise program, functional ROM exercises, [place] taping and postural training  Frequency: 2x week (2-3x week)  Duration in weeks: 8  Treatment plan discussed with: patient        Subjective Evaluation    History of Present Illness  Mechanism of injury: Pt notes a 60% improvement to date    She notes she has much improvement with her gait, she notes she is now able to ascend a single flight of stairs  She has full ROM but does note some pain at end range  She notes her knee does buckle on her later in her day  She notes her pain will wake her at night  Pt reports that she will occasionally have some locking at the L knee  Pain  Current pain ratin  At best pain ratin  At worst pain ratin  Location: L knee    Patient Goals  Patient goals for therapy: decreased pain, independence with ADLs/IADLs, return to sport/leisure activities, increased motion, increased strength, decreased edema and improved balance          Objective     Observations   Left Knee   Positive for edema and effusion  Tenderness   Left Knee   Tenderness in the ITB, lateral joint line, lateral retinaculum, medial joint line, patellar tendon and quadriceps tendon  No tenderness in the LCL (distal), LCL (proximal), MCL (distal) and MCL (proximal)  Active Range of Motion   Left Knee   Flexion: 137 degrees   Extension: 0 degrees     Right Knee   Flexion: 138 degrees   Extension: 0 degrees     Passive Range of Motion   Left Knee   Flexion: 138 degrees   Extension: 0 degrees     Strength/Myotome Testing     Additional Strength Details  Quad lag is no longer noted with SLR  4-/5 strength noted grossly LLE  Tests     Left Knee   Positive anterior Lachman  Additional Tests Details        Swelling     Left Knee Girth Measurement (cm)   Joint line: 39 cm  10 cm above joint line: 42 cm  10 cm below joint line: 34 cm    Right Knee Girth Measurement (cm)   Joint line: 39 cm  10 cm above joint line: 44 cm  10 cm below joint line: 35 cm    General Comments     Knee Comments  Neurovascular intact  Gait was antalgic in nature without B axillary crutches  Pt does have difficulty with SLS on the L and EC with ankle, knee, and hip strategy noted            Flowsheet Rows      Most Recent Value   PT/OT G-Codes   Current Score  48   Projected Score  58          Daily Treatment Diary   Diagnosis: L ACL tear   Precautions: NA   Manuals 11/29 12/3 11/15 11/19/18 11/26   PROM    KLS CMF                  IASTM L distal ITB      CMF     Re-eval  CMF      PF taping/PF offloading KLS CMF KLS CMF Pt def despite educuation provided   Exercise Diary            RBC 5 min  hold  5 min  5 min  5 min                 HS S :20x5   :20x5 :20x5 :20x5   Heel raises 3x10 L   2x15 focus L 2x15 2x15   Biodex weight shift            Clamshells BTB 3x10 BTB 3x10 GTB 3x10  BTB 3x10 BTB 3x10   Leg Press 30# 3x10  35# 3x10 10# 2x5  1x2 25# 2x10 25# 2x10   step ups 8in 3x10 8'' 3x10 8'' 1x10 8'' 1x10 8'' 2x10   wall sit :30x2, :10x1  :30x2, :10x1 :15x4 :20x4 :30x2, :25x2   SLS rebounder  Green 30x SLS rebounder green x30 :30x4 :30x3 green x30   SL bridge 2x10  x15 2x10 2x10   Bridge w/ HS curls 1x10  No HS curl (pain) 2x10      x-walks Blue 20'x6 Blue 20'x6   Green 2x10 Blue :20'x6   SLS star exercise x3 standing  x3      x3 with guarding   Prone HS curls Fast/slow 20x        Plank        Lateral plank        Lunge        SLS EC  :30x2                  Modalities            CP and IFC PRN 10 min 10 min  def CP 4 min   CP 10 min     Gameready

## 2018-12-06 ENCOUNTER — APPOINTMENT (OUTPATIENT)
Dept: PHYSICAL THERAPY | Facility: CLINIC | Age: 43
End: 2018-12-06
Payer: COMMERCIAL

## 2018-12-10 ENCOUNTER — OFFICE VISIT (OUTPATIENT)
Dept: PHYSICAL THERAPY | Facility: CLINIC | Age: 43
End: 2018-12-10
Payer: COMMERCIAL

## 2018-12-10 DIAGNOSIS — R60.9 PITTING EDEMA: Primary | ICD-10-CM

## 2018-12-10 DIAGNOSIS — S83.512D RUPTURE OF ANTERIOR CRUCIATE LIGAMENT OF LEFT KNEE, SUBSEQUENT ENCOUNTER: ICD-10-CM

## 2018-12-10 PROCEDURE — 97140 MANUAL THERAPY 1/> REGIONS: CPT | Performed by: PHYSICAL THERAPIST

## 2018-12-10 PROCEDURE — 97112 NEUROMUSCULAR REEDUCATION: CPT | Performed by: PHYSICAL THERAPIST

## 2018-12-10 PROCEDURE — 97110 THERAPEUTIC EXERCISES: CPT | Performed by: PHYSICAL THERAPIST

## 2018-12-10 NOTE — PROGRESS NOTES
Daily Note     Today's date: 12/10/2018  Patient name: Jose A Coyle  : 1975  MRN: [id number]  Referring provider: Aguilar Jerome DO  Dx:   Encounter Diagnosis     ICD-10-CM    1  Pitting edema R60 9    2  Rupture of anterior cruciate ligament of left knee, subsequent encounter S86 512D                   Subjective: Patient states she continues to notice an improvement with daily activities  She still has anterior L knee pain, increasing with knee flexion and positional changes  Objective: See treatment diary below  Diagnosis: L ACL tear   Precautions: NA   Manuals 11/29 12/3 12/10  11/26   PROM                        IASTM L distal ITB            Re-eval   CMF        PF taping/PF offloading KLS CMF KLS  Pt def despite educuation provided   Exercise Diary            RBC 5 min  hold  5 min  5 min                 HS S :20x5   :20x5  :20x5   Heel raises 3x10 L    2x10 L only   2x15   Clamshells BTB 3x10 BTB 3x10 BTB 3x10   BTB 3x10   Leg Press 30# 3x10  35# 3x10 40# 3x10   25# 2x10   step ups 8in 3x10 8'' 3x10 8'' 3x10  8'' 2x10   wall sit :30x2, :10x1  :30x2, :10x1 :30, :20, :27, :26  :30x2, :25x2   SLS rebounder  Green 30x SLS rebounder green x30 x30 green   green x30   SL bridge 2x10   x15  2x10   Bridge w/ HS curls 1x10  No HS curl (pain) 2x10 2x10      x-walks Blue 20'x6 Blue 20'x6 Black 20'x4  Blue :20'x6   SLS star exercise x3 standing  x3  x3  x3 with guarding   Prone HS curls Fast/slow 20x    20x green       Plank     :10x3       Lateral plank            Lunge            SLS EC  :30x2    SLS foam :30x2                   Modalities            CP and IFC PRN 10 min 10 min  def  CP 10 min     Gameready                                  Assessment: Patient reports pain when approaching full extension or flexion following WBing  Continued to progress with knee strengthening as tolerated  Pain intermittently with progressions  Plan: Progress treatment as tolerated

## 2018-12-13 ENCOUNTER — OFFICE VISIT (OUTPATIENT)
Dept: PHYSICAL THERAPY | Facility: CLINIC | Age: 43
End: 2018-12-13
Payer: COMMERCIAL

## 2018-12-13 DIAGNOSIS — R60.9 PITTING EDEMA: Primary | ICD-10-CM

## 2018-12-13 DIAGNOSIS — S83.512D RUPTURE OF ANTERIOR CRUCIATE LIGAMENT OF LEFT KNEE, SUBSEQUENT ENCOUNTER: ICD-10-CM

## 2018-12-13 PROCEDURE — 97110 THERAPEUTIC EXERCISES: CPT

## 2018-12-13 PROCEDURE — 97112 NEUROMUSCULAR REEDUCATION: CPT

## 2018-12-13 NOTE — PROGRESS NOTES
Daily Note     Today's date: 2018  Patient name: Eugenio Cha  : 1975  MRN: [id number]  Referring provider: Navi Lyles DO  Dx:   Encounter Diagnosis     ICD-10-CM    1  Pitting edema R60 9    2  Rupture of anterior cruciate ligament of left knee, subsequent encounter S84 512D                   Subjective: Patient states she is sore all the time and she is sick and tired of it  She states some functional activities have improved, but overall she still has too much difficulty  She states she returned to wearing low heels at work which she feels has been going well  She continues to have difficulty with stairs consistently  Objective: See treatment diary below  Diagnosis: L ACL tear   Precautions: NA   Manuals 11/29 12/3 12/10 12/13 11/26   PROM                           IASTM L distal ITB             Re-eval   CMF         PF taping/PF offloading KLS CMF KLS Def  Pt def despite education provided   Exercise Diary             RBC 5 min  hold  5 min   5 min 5 min                  HS S :20x5   :20x5 :20x5  HEP   Heel raises 3x10 L    2x10 L only  2x10 L only  2x15   Clamshells BTB 3x10 BTB 3x10 BTB 3x10  BTB 3x10  BTB 3x10   Leg Press 30# 3x10  35# 3x10 40# 3x10  40# 3x10  25# 2x10   step ups 8in 3x10 8'' 3x10 8'' 3x10 -- 8'' 2x10   wall sit :30x2, :10x1  :30x2, :10x1 :30, :20, :27, :26 :30, :16, :28, :22 :30x2, :25x2   SLS rebounder  Green 30x SLS rebounder green x30 x30 green  x30 green  green x30   SL bridge 2x10   x15 2x10  2x10   Bridge w/ HS curls 1x10  No HS curl (pain) 2x10 2x10 2x10  2x10    x-walks Blue 20'x6 Blue 20'x6 Black 20'x4 Black :20x4  Blue :20'x6   SLS star exercise x3 standing  x3  x3 x3 x3 with guarding   Prone HS curls Fast/slow 20x    20x green  x20 green     Plank     :10x3  :10x3     Lateral plank             Lunge             SLS   :30x2    SLS foam :30x2 Refused EC - foam :30x2     Ball squats on wall    2x10                                                           Modalities             CP and IFC PRN 10 min 10 min  def def CP 10 min     Gameready                                 Assessment: Patient demonstrates improved single leg balance with dynamic movement  Demonstrates quad weakness and increase muscle fatigue with strengthening exercises  Improved hamstring strength  Patient resistant to proprioception progressions; refusing to progress  Updated HEP  Plan: Progress treatment as tolerated

## 2018-12-17 ENCOUNTER — OFFICE VISIT (OUTPATIENT)
Dept: PHYSICAL THERAPY | Facility: CLINIC | Age: 43
End: 2018-12-17
Payer: COMMERCIAL

## 2018-12-17 DIAGNOSIS — S83.512D RUPTURE OF ANTERIOR CRUCIATE LIGAMENT OF LEFT KNEE, SUBSEQUENT ENCOUNTER: ICD-10-CM

## 2018-12-17 DIAGNOSIS — R60.9 PITTING EDEMA: Primary | ICD-10-CM

## 2018-12-17 PROCEDURE — 97110 THERAPEUTIC EXERCISES: CPT

## 2018-12-17 PROCEDURE — 97112 NEUROMUSCULAR REEDUCATION: CPT

## 2018-12-17 NOTE — PROGRESS NOTES
Daily Note     Today's date: 2018  Patient name: Jd Root  : 1975  MRN: [id number]  Referring provider: Christine Dover DO  Dx:   Encounter Diagnosis     ICD-10-CM    1  Pitting edema R60 9    2  Rupture of anterior cruciate ligament of left knee, subsequent encounter S86 521D                   Subjective: Patient reports that she continues to have pain all of the time  She states there are no changes  Objective: See treatment diary below  Diagnosis: L ACL tear   Precautions: NA   Manuals 11/29 12/3 12/10 12/13 12/17   PROM                           IASTM L distal ITB             Re-eval   CMF         PF taping/PF offloading KLS CMF KLS Def  CMF   Exercise Diary             RBC 5 min  hold  5 min  5 min 5 min                  HS S :20x5   :20x5 :20x5  :20x5   Heel raises 3x10 L    2x10 L only  2x10 L only  2x10 L only   Clamshells BTB 3x10 BTB 3x10 BTB 3x10  BTB 3x10  BTB 3x10   Leg Press 30# 3x10  35# 3x10 40# 3x10  40# 3x10  40# 3x10   step ups 8in 3x10 8'' 3x10 8'' 3x10 --    wall sit :30x2, :10x1  :30x2, :10x1 :30, :20, :27, :26 :30, :16, :28, :22    SLS rebounder  Green 30x SLS rebounder green x30 x30 green  x30 green  x30 green   SL bridge 2x10   x15 2x10  2x10   Bridge w/ HS curls 1x10  No HS curl (pain) 2x10 2x10 2x10  2x10   x-walks Blue 20'x6 Blue 20'x6 Black 20'x4 Black :20x4  Black :20x4   SLS star exercise x3 standing  x3  x3 x3 x3   Prone HS curls Fast/slow 20x    20x green  x20 green  x20 green   Plank     :10x3  :10x3     Lateral plank             Lunge             SLS   :30x2    SLS foam :30x2 Refused EC - foam :30x2  SLS foam  :30x2   Ball squats on wall    2x10  2x10                                                         Modalities             CP and IFC PRN 10 min 10 min  def def CP 5 min     Gameready                             1:1 with CF, DPT from [id number]    Assessment: Pt tolerated session well  No complaints throughout session    Pt reports relief with taping to ITB  Will try IASTM next session  Plan: Progress treatment as tolerated

## 2018-12-20 ENCOUNTER — APPOINTMENT (OUTPATIENT)
Dept: PHYSICAL THERAPY | Facility: CLINIC | Age: 43
End: 2018-12-20
Payer: COMMERCIAL

## 2018-12-24 ENCOUNTER — OFFICE VISIT (OUTPATIENT)
Dept: PHYSICAL THERAPY | Facility: CLINIC | Age: 43
End: 2018-12-24
Payer: COMMERCIAL

## 2018-12-24 DIAGNOSIS — S83.512D RUPTURE OF ANTERIOR CRUCIATE LIGAMENT OF LEFT KNEE, SUBSEQUENT ENCOUNTER: ICD-10-CM

## 2018-12-24 DIAGNOSIS — R60.9 PITTING EDEMA: Primary | ICD-10-CM

## 2018-12-24 PROCEDURE — 97110 THERAPEUTIC EXERCISES: CPT

## 2018-12-24 PROCEDURE — 97112 NEUROMUSCULAR REEDUCATION: CPT

## 2018-12-24 NOTE — PROGRESS NOTES
Daily Note     Today's date: 2018  Patient name: Hunter Sal  : 1975  MRN: [id number]  Referring provider: Monica Feldman DO  Dx:   Encounter Diagnosis     ICD-10-CM    1  Pitting edema R60 9    2  Rupture of anterior cruciate ligament of left knee, subsequent encounter S85 512D                   Subjective: Patient states she is compliant with HEP  She reports improved strength, but still continues to have pain  She notes she wears her brace at all times in the community due to instability and improved comfort  Objective: See treatment diary below  Diagnosis: L ACL tear   Precautions: NA   Manuals 12/24 12/3 12/10 12/13 12/17   PROM                           IASTM L distal ITB             Re-eval   CMF         PF taping/PF offloading  CMF KLS Def  CMF   Exercise Diary             RBC 5 min  hold  5 min  5 min 5 min                  HS S :20x5   :20x5 :20x5  :20x5   Heel raises 3x10 L    2x10 L only  2x10 L only  2x10 L only   Clamshells elevated 0# 2x10       Leg Press 45# 3x10  35# 3x10 40# 3x10  40# 3x10  40# 3x10   step ups 8in 3x10 8'' 3x10 8'' 3x10 --     wall sit :30x4  :30x2, :10x1 :30, :20, :27, :26 :30, :16, :28, :22    SLS rebounder  Green 30x SLS rebounder green x30 x30 green  x30 green  x30 green   SL bridge 2x10 with foam    x15 2x10  2x10   Bridge w/ HS curls 2x10 No HS curl (pain) 2x10 2x10 2x10  2x10   x-walks Black 20'x6 Blue 20'x6 Black 20'x4 Black :20x4  Black :20x4   SLS star exercise x3 standing  x3  x3 x3 x3   Prone HS curls Fast/slow 20x    20x green  x20 green  x20 green   Plank :15x4   :10x3  :10x3     Lateral plank :10x4           Lunge 10x each            SLS   :30x2    SLS foam :30x2 Refused EC - foam :30x2  SLS foam  :30x2   Ball squats on wall 3x10     2x10  2x10                                                                                       Modalities             CP and IFC PRN def 10 min   def def CP 5 min     Gameready                                Assessment: Continued with outlined program  Progressed within tolerance to improve LE strength and stability  Trialed BLE hopping; increase pain with impact  Cued for correct of hip IR with exercises  Updated HEP with good understanding  Plan: Progress treatment as tolerated

## 2018-12-27 ENCOUNTER — OFFICE VISIT (OUTPATIENT)
Dept: PHYSICAL THERAPY | Facility: CLINIC | Age: 43
End: 2018-12-27
Payer: COMMERCIAL

## 2018-12-27 DIAGNOSIS — S83.512D RUPTURE OF ANTERIOR CRUCIATE LIGAMENT OF LEFT KNEE, SUBSEQUENT ENCOUNTER: ICD-10-CM

## 2018-12-27 DIAGNOSIS — R60.9 PITTING EDEMA: Primary | ICD-10-CM

## 2018-12-27 PROCEDURE — 97112 NEUROMUSCULAR REEDUCATION: CPT

## 2018-12-27 PROCEDURE — 97110 THERAPEUTIC EXERCISES: CPT

## 2018-12-27 NOTE — PROGRESS NOTES
Daily Note     Today's date: 2018  Patient name: Leti Paz  : 1975  MRN: [id number]  Referring provider: Rhonda Flores DO  Dx:   Encounter Diagnosis     ICD-10-CM    1  Pitting edema R60 9    2  Rupture of anterior cruciate ligament of left knee, subsequent encounter L09 633D                   Subjective: Patient states she has no new complaints  She remains frustrated with her progress and has been compliant with HEP  She states she wears her brace at all times outside of PT and not when at home  Objective: See treatment diary below  Diagnosis: L ACL tear   Precautions: NA   Manuals 12/24 12/27 12/10 12/13 12/17   PROM                           IASTM L distal ITB             Re-eval            PF taping/PF offloading    KLS Def  CMF   Exercise Diary             RBC 5 min  5 min  5 min   5 min 5 min                  HS S :20x5   :20x5 :20x5  :20x5   Heel raises 3x10 L    2x10 L only  2x10 L only  2x10 L only   Clamshells elevated 0# 2x10 --         Leg Press 45# 3x10  50# 3x10 40# 3x10  40# 3x10  40# 3x10   step ups 8in 3x10  8'' 3x10 --     wall sit :30x4  def :30, :20, :27, :26 :30, :16, :28, :22     SLS rebounder  Green 30x Green 20x foam x30 green  x30 green  x30 green   SL bridge 2x10 with foam  2x10 with foam x15 2x10  2x10   Bridge w/ HS curls 2x10 2x15 2x10 2x10  2x10   x-walks Black 20'x6 Black 20'x6 Black 20'x4 Black :20x4  Black :20x4   SLS star exercise x3 standing  x5 standing  x3 x3 x3   Prone HS curls Fast/slow 20x  GTB 20x each 20x green  x20 green  x20 green   Plank :15x4 :15x4 :10x3  :10x3     Lateral plank :10x4 :15x4         Lunges 10x each           SLS   :30x2   SLS foam :30x2 Refused EC - foam :30x2  SLS foam  :30x2   Ball squats on wall 3x10 3x10    2x10  2x10   Bird dips    2x10 5#                                                                           Modalities            CP and IFC PRN def 5 min  def def CP 5 min     Gameready                        Assessment: Continued to progress outlined program  Noted muscle fatigue with LE strengthening exercises  Cued for proper hip ER with exercises  Updated HEP with good understanding  Plan: Progress treatment as tolerated

## 2018-12-31 ENCOUNTER — EVALUATION (OUTPATIENT)
Dept: PHYSICAL THERAPY | Facility: CLINIC | Age: 43
End: 2018-12-31
Payer: COMMERCIAL

## 2018-12-31 ENCOUNTER — TRANSCRIBE ORDERS (OUTPATIENT)
Dept: PHYSICAL THERAPY | Facility: CLINIC | Age: 43
End: 2018-12-31

## 2018-12-31 DIAGNOSIS — S83.512D RUPTURE OF ANTERIOR CRUCIATE LIGAMENT OF LEFT KNEE, SUBSEQUENT ENCOUNTER: Primary | ICD-10-CM

## 2018-12-31 DIAGNOSIS — R60.9 PITTING EDEMA: ICD-10-CM

## 2018-12-31 PROCEDURE — 97110 THERAPEUTIC EXERCISES: CPT | Performed by: PHYSICAL THERAPIST

## 2018-12-31 PROCEDURE — G8990 OTHER PT/OT CURRENT STATUS: HCPCS | Performed by: PHYSICAL THERAPIST

## 2018-12-31 PROCEDURE — 97140 MANUAL THERAPY 1/> REGIONS: CPT | Performed by: PHYSICAL THERAPIST

## 2018-12-31 PROCEDURE — G8991 OTHER PT/OT GOAL STATUS: HCPCS | Performed by: PHYSICAL THERAPIST

## 2018-12-31 NOTE — PROGRESS NOTES
PT Re-evaluation    Today's date: 2018  Patient name: Miryam Dewitt  : 1975  MRN: [id number]  Referring provider: Rich Moseley DO  Dx:   Encounter Diagnosis     ICD-10-CM    1  Rupture of anterior cruciate ligament of left knee, subsequent encounter S83 512D    2  Pitting edema R60 9                   Assessment  Assessment details: Patient is a 39 y o  female who presents with the above listed impairments  Patient would continue to benefit from skilled PT services to address these impairments and to maximize function  Thank you for the referral     Impairments: abnormal coordination, abnormal gait, abnormal muscle firing, abnormal movement, activity intolerance, impaired balance, impaired physical strength, pain with function and weight-bearing intolerance  Understanding of Dx/Px/POC: good   Prognosis: good    Goals  Impairment Goals  - Decrease pain by 50% in 2 weeks  - partially met  - Increase left knee ROM to WNL 2 weeks  - met  - Increase left lower extremity strength golbally to 4/5 in 6 weeks  -  Met  - Increase left lower extremity strength golbally to 5/5 in 6 weeks  Functional Goals  - Return to Prior Level of Function in 8 weeks - partially met  - Will be able to ambulate without crutches with normalized gait pattern in 2 weeks  - partially met  - Patient will be independent with HEP in 8 weeks - not met    Plan  Patient would benefit from: skilled PT  Planned modality interventions: cryotherapy  Planned therapy interventions: joint mobilization, manual therapy, neuromuscular re-education, patient education, strengthening, stretching, therapeutic activities, therapeutic exercise, home exercise program, functional ROM exercises, Palma taping and postural training  Frequency: 2x week (2-3x week)  Duration in weeks: 6  Treatment plan discussed with: patient, PTA and referring physician        Subjective Evaluation    History of Present Illness  Mechanism of injury: Pt notes a 65% improvement to date  She notes that ascending and descending steps is improved  She notes that she feels her knee will feel a little weak with steps  She notes that walking on flat surfaces her pain and function has improved  She notes that her pain is still constant  She reports that she wants to progress within her program in order to be able to run and jump  Pain  Current pain ratin  At best pain ratin  At worst pain ratin  Location: L knee    Patient Goals  Patient goals for therapy: decreased pain, independence with ADLs/IADLs, return to sport/leisure activities, increased motion, increased strength, decreased edema and improved balance          Objective     Observations   Left Knee   Positive for edema and effusion  Tenderness   Left Knee   Tenderness in the lateral joint line and medial joint line  No tenderness in the ITB, lateral retinaculum, LCL (distal), LCL (proximal), MCL (distal), MCL (proximal), patellar tendon and quadriceps tendon  Additional Tenderness Details  L tibia, L medial distal HS  Active Range of Motion   Left Knee   Flexion: 137 degrees   Extension: 0 degrees     Right Knee   Flexion: 138 degrees   Extension: 0 degrees     Passive Range of Motion   Left Knee   Flexion: 138 degrees   Extension: 0 degrees     Strength/Myotome Testing     Additional Strength Details  Quad lag is no longer noted with SLR  4/5 strength noted grossly LLE  Tests     Left Knee   Positive anterior Lachman  Additional Tests Details        Swelling     Left Knee Girth Measurement (cm)   Joint line: 39 cm  10 cm above joint line: 43 cm  10 cm below joint line: 34 cm    Right Knee Girth Measurement (cm)   Joint line: 39 cm  10 cm above joint line: 44 cm  10 cm below joint line: 35 cm    General Comments     Knee Comments  Neurovascular intact  Gait was antalgic in nature without assistive device    Pt does have difficulty with SLS on the L and EC with ankle, knee, and hip strategy noted, she is able to hold for 7 seconds  She was able to tolerate jogging in Alter-G at 70% WB and 4/10 pain  Daily Treatment Diary   Diagnosis: L ACL tear   Precautions: NA   Manuals 12/24 12/27 12/31     PROM           IASTM L distal medial HS     CMF     IASTM L distal ITB          Re-eval    CMF     PF taping/PF offloading         Exercise Diary          RBC 5 min  5 min       Alter-G     70%-75% 6 min       HS S :20x5        Heel raises 3x10 L         Clamshells elevated 0# 2x10 --      Leg Press 45# 3x10  50# 3x10      step ups 8in 3x10       wall sit :30x4  def      SLS rebounder  Green 30x Green 20x foam      SL bridge 2x10 with foam  2x10 with foam      Bridge w/ HS curls 2x10 2x15      x-walks Black 20'x6 Black 20'x6      SLS star exercise x3 standing  x5 standing       Prone HS curls Fast/slow 20x  GTB 20x each      Plank :15x4 :15x4      Lateral plank :10x4 :15x4      Lunges 10x each        SLS   :30x2        Ball squats on wall 3x10 3x10       Bird dips    2x10 5#                                                         Modalities         CP and IFC PRN def 5 min        Gameready

## 2018-12-31 NOTE — LETTER
2018    Meenu Holguin DO  [facility]    Patient: Lucia Joyce   YOB: 1975   Date of Visit: 2018     Encounter Diagnosis     ICD-10-CM    1  Rupture of anterior cruciate ligament of left knee, subsequent encounter S83 512D    2  Pitting edema R60 9        Dear Dr Sultana Codington:    Please review the attached Plan of Care from [address] [place] [address]'s recent visit  Please verify that you agree therapy should continue by signing the attached document and sending it back to our office  If you have any questions or concerns, please don't hesitate to call  Sincerely,    Priscella Oppenheim, PT      Referring Provider:      I certify that I have read the below Plan of Care and certify the need for these services furnished under this plan of treatment while under my care  Meenu Holguin DO  [address]: [phone]          PT Re-evaluation    Today's date: 2018  Patient name: Lucia Joyce  : 1975  MRN: [id number]  Referring provider: Yazmin Campbell DO  Dx:   Encounter Diagnosis     ICD-10-CM    1  Rupture of anterior cruciate ligament of left knee, subsequent encounter S83 512D    2  Pitting edema R60 9                   Assessment  Assessment details: Patient is a 39 y o  female who presents with the above listed impairments  Patient would continue to benefit from skilled PT services to address these impairments and to maximize function  Thank you for the referral     Impairments: abnormal coordination, abnormal gait, abnormal muscle firing, abnormal movement, activity intolerance, impaired balance, impaired physical strength, pain with function and weight-bearing intolerance  Understanding of Dx/Px/POC: good   Prognosis: good    Goals  Impairment Goals  - Decrease pain by 50% in 2 weeks  - partially met  - Increase left knee ROM to WNL 2 weeks   - met  - Increase left lower extremity strength golbally to 4/5 in 6 weeks  -  Met  - Increase left lower extremity strength golbally to 5/5 in 6 weeks  Functional Goals  - Return to Prior Level of Function in 8 weeks - partially met  - Will be able to ambulate without crutches with normalized gait pattern in 2 weeks  - partially met  - Patient will be independent with HEP in 8 weeks - not met    Plan  Patient would benefit from: skilled PT  Planned modality interventions: cryotherapy  Planned therapy interventions: joint mobilization, manual therapy, neuromuscular re-education, patient education, strengthening, stretching, therapeutic activities, therapeutic exercise, home exercise program, functional ROM exercises, Palma taping and postural training  Frequency: 2x week (2-3x week)  Duration in weeks: 6  Treatment plan discussed with: patient, PTA and referring physician        Subjective Evaluation    History of Present Illness  Mechanism of injury: Pt notes a 65% improvement to date  She notes that ascending and descending steps is improved  She notes that she feels her knee will feel a little weak with steps  She notes that walking on flat surfaces her pain and function has improved  She notes that her pain is still constant  She reports that she wants to progress within her program in order to be able to run and jump  Pain  Current pain ratin  At best pain ratin  At worst pain ratin  Location: L knee    Patient Goals  Patient goals for therapy: decreased pain, independence with ADLs/IADLs, return to sport/leisure activities, increased motion, increased strength, decreased edema and improved balance          Objective     Observations   Left Knee   Positive for edema and effusion  Tenderness   Left Knee   Tenderness in the lateral joint line and medial joint line   No tenderness in the ITB, lateral retinaculum, LCL (distal), LCL (proximal), MCL (distal), MCL (proximal), patellar tendon and quadriceps tendon  Additional Tenderness Details  L tibia, L medial distal HS  Active Range of Motion   Left Knee   Flexion: 137 degrees   Extension: 0 degrees     Right Knee   Flexion: 138 degrees   Extension: 0 degrees     Passive Range of Motion   Left Knee   Flexion: 138 degrees   Extension: 0 degrees     Strength/Myotome Testing     Additional Strength Details  Quad lag is no longer noted with SLR  4/5 strength noted grossly LLE  Tests     Left Knee   Positive anterior Lachman  Additional Tests Details        Swelling     Left Knee Girth Measurement (cm)   Joint line: 39 cm  10 cm above joint line: 43 cm  10 cm below joint line: 34 cm    Right Knee Girth Measurement (cm)   Joint line: 39 cm  10 cm above joint line: 44 cm  10 cm below joint line: 35 cm    General Comments     Knee Comments  Neurovascular intact  Gait was antalgic in nature without assistive device  Pt does have difficulty with SLS on the L and EC with ankle, knee, and hip strategy noted, she is able to hold for 7 seconds  She was able to tolerate jogging in Alter-G at 70% WB and 4/10 pain  Daily Treatment Diary   Diagnosis: L ACL tear   Precautions: NA   Manuals 12/24 12/27 12/31     PROM           IASTM L distal medial HS     CMF     IASTM L distal ITB          Re-eval    CMF     PF taping/PF offloading         Exercise Diary          RBC 5 min  5 min       Alter-G     70%-75% 6 min       HS S :20x5        Heel raises 3x10 L         Clamshells elevated 0# 2x10 --      Leg Press 45# 3x10  50# 3x10      step ups 8in 3x10       wall sit :30x4  def      SLS rebounder  Green 30x Green 20x foam      SL bridge 2x10 with foam  2x10 with foam      Bridge w/ HS curls 2x10 2x15      x-walks Black 20'x6 Black 20'x6      SLS star exercise x3 standing  x5 standing       Prone HS curls Fast/slow 20x  GTB 20x each      Plank :15x4 :15x4      Lateral plank :10x4 :15x4      Lunges 10x each        SLS   :30x2        Ball squats on wall 3x10 3x10       Bird dips    2x10 5#                                                         Modalities         CP and IFC PRN def 5 min        Gameready

## 2019-01-11 ENCOUNTER — OFFICE VISIT (OUTPATIENT)
Dept: PHYSICAL THERAPY | Facility: CLINIC | Age: 44
End: 2019-01-11
Payer: COMMERCIAL

## 2019-01-11 DIAGNOSIS — S83.512D RUPTURE OF ANTERIOR CRUCIATE LIGAMENT OF LEFT KNEE, SUBSEQUENT ENCOUNTER: Primary | ICD-10-CM

## 2019-01-11 DIAGNOSIS — R60.9 PITTING EDEMA: ICD-10-CM

## 2019-01-11 PROCEDURE — 97110 THERAPEUTIC EXERCISES: CPT

## 2019-01-11 PROCEDURE — 97140 MANUAL THERAPY 1/> REGIONS: CPT

## 2019-01-11 PROCEDURE — 97112 NEUROMUSCULAR REEDUCATION: CPT

## 2019-01-11 NOTE — PROGRESS NOTES
Daily Note     Today's date: 2019  Patient name: Marcio Chandler  : 1975  MRN: [id number]  Referring provider: Santy Abdul DO  Dx:   Encounter Diagnosis     ICD-10-CM    1  Rupture of anterior cruciate ligament of left knee, subsequent encounter S83 512D    2  Pitting edema R60 9                   Subjective: Patient arrives to therapy stating her strength has continued to improve as she has been compliant with HEP  She states she still wears her brace when ambulating distances  She states she has been traveling for work and when she is unable to perform exercises on the road, she has been using the pool for exercise  Objective: See treatment diary below  Diagnosis: L ACL tear   Precautions: NA   Manuals 19     PROM              IASTM L distal medial HS     CMF       IASTM L distal ITB             Re-eval     CMF       PF taping/PF offloading       KLS     Exercise Diary             RBC 5 min  5 min          Alter-G     70%-75% 6 min   72% 10 min      HS S :20x5           Heel raises 3x10 L            Clamshells elevated 0# 2x10 --         Leg Press 45# 3x10  50# 3x10         step ups 8in 3x10           wall sit :30x4  def         SLS rebounder  Green 30x Green 20x foam         SL bridge 2x10 with foam  2x10 with foam         Bridge w/ HS curls 2x10 2x15         x-walks Black 20'x6 Black 20'x6         SLS star exercise x3 standing  x5 standing    x5 standing     Prone HS curls Fast/slow 20x  GTB 20x each         Plank :15x4 :15x4         Lateral plank :10x4 :15x4         Lunges 10x each     onto disc Black disc 15x      SLS   :30x2            Ball squats on wall 3x10 3x10          Bird dips    2x10 5#                         hop fwd/lat      start pain     SLS 3 way cone exercise      start 3x                    Pt education        15 minutes      Modalities             CP and IFC PRN def 5 min           Gameready                                  Assessment: PT educated patient on proper exercise technique to avoid further injury  Patient was able to perform slight job x 5 minutes with minimal pain symptoms, once increased returned to walking  Noted hip internal rotation with cone reaches; cued for proper technique  Pain with hopping with bilateral LEs  Updated HEP  Plan: Progress treatment as tolerated

## 2019-01-16 ENCOUNTER — OFFICE VISIT (OUTPATIENT)
Dept: PHYSICAL THERAPY | Facility: CLINIC | Age: 44
End: 2019-01-16
Payer: COMMERCIAL

## 2019-01-16 DIAGNOSIS — R60.9 PITTING EDEMA: ICD-10-CM

## 2019-01-16 DIAGNOSIS — S83.512D RUPTURE OF ANTERIOR CRUCIATE LIGAMENT OF LEFT KNEE, SUBSEQUENT ENCOUNTER: Primary | ICD-10-CM

## 2019-01-16 PROCEDURE — 97110 THERAPEUTIC EXERCISES: CPT

## 2019-01-16 PROCEDURE — 97140 MANUAL THERAPY 1/> REGIONS: CPT

## 2019-01-16 PROCEDURE — 97112 NEUROMUSCULAR REEDUCATION: CPT

## 2019-01-16 NOTE — PROGRESS NOTES
Daily Note     Today's date: 2019  Patient name: Diamond Pineda  : 1975  MRN: [id number]  Referring provider: Rebekah Curiel DO  Dx:   Encounter Diagnosis     ICD-10-CM    1  Rupture of anterior cruciate ligament of left knee, subsequent encounter S83 512D    2  Pitting edema R60 9                   Subjective: Patient states she has continuous anterior knee pain, superficial and deep and radiating down her shin  She reports 1-2/10 pain even at rest  She states she has been compliant with beginning higher level exercises while at home  Objective: See treatment diary below  Diagnosis: L ACL tear   Precautions: NA   Manuals 19   PROM              IASTM L distal medial HS     CMF       IASTM L distal ITB             Re-eval     CMF       PF taping/PF offloading       KLS KLS    Exercise Diary             RBC 5 min  5 min          Alter-G     70%-75% 6 min   72% 10 min  75-80% 10 min    HS S :20x5           Heel raises 3x10 L            Clamshells elevated 0# 2x10 --         Leg Press 45# 3x10  50# 3x10         step ups 8in 3x10           wall sit :30x4  def         SLS rebounder  Green 30x Green 20x foam     Green 30x foam    Agility ladder       2x each BLEs   Bridge w/ HS curls 2x10 2x15         x-walks Black 20'x6 Black 20'x6         SLS star exercise x3 standing  x5 standing    x5 standing     Prone HS curls Fast/slow 20x  GTB 20x each         Plank :15x4 :15x4         Lateral plank :10x4 :15x4         Lunges 10x each     onto disc Black disc 15x      SLS   :30x2            Ball squats on wall 3x10 3x10      3x10   Bird dips    2x10 5#          Light plyometrics on LP         30# 3x10    hop fwd/lat      start pain    SLS 3 way cone exercise      start 3x  5x    Lateral step downs         20x (cueing with green TBand)   Pt education        15 minutes      Modalities             CP and IFC PRN def 5 min           Gameready                                Assessment: Continued to progress outlined program  Progressed with higher level exercises; agility ladder with bilateral LEs with noted instability intermittently, improved with offloading taping technique  Trialed plyos on LP with minimal pain with impact  Cueing required for lateral step downs; Tband used for proper hip external rotation  Plan: Progress treatment as tolerated

## 2019-01-22 ENCOUNTER — OFFICE VISIT (OUTPATIENT)
Dept: OBGYN CLINIC | Facility: OTHER | Age: 44
End: 2019-01-22
Payer: COMMERCIAL

## 2019-01-22 VITALS
DIASTOLIC BLOOD PRESSURE: 89 MMHG | HEIGHT: 67 IN | BODY MASS INDEX: 25.9 KG/M2 | WEIGHT: 165 LBS | SYSTOLIC BLOOD PRESSURE: 122 MMHG | HEART RATE: 82 BPM

## 2019-01-22 DIAGNOSIS — S83.512D RUPTURE OF ANTERIOR CRUCIATE LIGAMENT OF LEFT KNEE, SUBSEQUENT ENCOUNTER: Primary | ICD-10-CM

## 2019-01-22 PROCEDURE — 99213 OFFICE O/P EST LOW 20 MIN: CPT | Performed by: ORTHOPAEDIC SURGERY

## 2019-01-22 NOTE — PROGRESS NOTES
Assessment  Problem List Items Addressed This Visit     Left anterior cruciate ligament tear - Primary          Discussion and Plan:  The patient demonstrates a stable left knee to Lachman examination and has not had any episodes of instability up to this point since we have initiated nonoperative care  I feel this is the appropriate treatment for her injury and I feel that she would not benefit from ACL reconstruction at this time  She does have very specific questions about a time line to full recovery which I am unable to fully answer except to say that she is making excellent progress and that she may have some mild symptoms in that knee that will not be relieved by ACL reconstruction  I helped her understand that ACL reconstruction is aimed at providing a stable knee and at this point her knee has demonstrated itself to be quite stable both clinically and on examination  I recommend she continue with her rehabilitation with progression towards functional rehab probably over the next 2 to 4 weeks and I would be happy to re-evaluate her in the future if she develops instability of the left knee  I am very encouraged by her progress and I very happy that she has been able to avoid intervention such as ACL reconstruction  Subjective:   Patient ID: Rebeca Elliott is a 39 y o  female      HPI  Maria Guadalupe Armendariz is a 39 y o female who presents for a follow up of her left ACL tear  During her last visit conservative management was discussed and she decided to proceed with this option  Since her last visit she has been attending physical therapy  She feels that she does have to push physical therapy to challenge her  Since december she has noticed soreness in her knee  This pain increases with activity  At rest her pain is a 1/10 with activity it can increase to 4-6/10  After particularly  strenuous days she does notice her knee swells up  She is not taking any medication for her symptoms   She does wear her brace when she is up and walking around  At night she does elevate, wear a compression sleeve, and ice  She does not feel instability normally, however at times she can feel wobbly especially if she is wearing high heels  She denies numbness and tingling  The following portions of the patient's history were reviewed and updated as appropriate: allergies, current medications, past family history, past medical history, past social history, past surgical history and problem list     Review of Systems   Constitutional: Negative for chills, fever and unexpected weight change  HENT: Negative for hearing loss, nosebleeds and sore throat  Eyes: Negative for pain, redness and visual disturbance  Respiratory: Negative for cough, shortness of breath and wheezing  Cardiovascular: Negative for chest pain, palpitations and leg swelling  Gastrointestinal: Negative for abdominal pain, nausea and vomiting  Endocrine: Negative for polydipsia and polyuria  Genitourinary: Negative for dyspareunia and hematuria  Musculoskeletal: Positive for joint swelling and myalgias  Negative for arthralgias  Skin: Negative for rash and wound  Neurological: Negative for dizziness, numbness and headaches  Psychiatric/Behavioral: Negative for decreased concentration and suicidal ideas  The patient is not nervous/anxious  Objective:  Left Knee Exam   Swelling: Mild  Effusion: No    Tenderness   The patient is experiencing tenderness in the patellar tendon  Range of Motion   Extension: 0  Flexion:     140    Tests   Lachman:  Anterior - Negative      Varus:  Negative  Valgus: Negative    Comments:  Stable on exam           Physical Exam   Constitutional: She is oriented to person, place, and time  She appears well-developed and well-nourished  HENT:   Head: Normocephalic and atraumatic  Eyes: Pupils are equal, round, and reactive to light  Conjunctivae are normal    Neck: Normal range of motion  Neck supple  Cardiovascular: Normal rate and intact distal pulses  Pulmonary/Chest: Effort normal  No respiratory distress  Musculoskeletal:        Left knee: Patellar tendon tenderness noted  As noted in HPI   Neurological: She is alert and oriented to person, place, and time  Skin: Skin is warm and dry  Psychiatric: She has a normal mood and affect  Her behavior is normal    Vitals reviewed  I have personally reviewed pertinent films in PACS and my interpretation is as follows    No new images to review       Scribe Attestation    I,:   Rickie Kwan MA am acting as a scribe while in the presence of the attending physician :        I,:   Khoa Ellis MD personally performed the services described in this documentation    as scribed in my presence :

## 2019-01-23 ENCOUNTER — OFFICE VISIT (OUTPATIENT)
Dept: PHYSICAL THERAPY | Facility: CLINIC | Age: 44
End: 2019-01-23
Payer: COMMERCIAL

## 2019-01-23 DIAGNOSIS — S83.512D RUPTURE OF ANTERIOR CRUCIATE LIGAMENT OF LEFT KNEE, SUBSEQUENT ENCOUNTER: Primary | ICD-10-CM

## 2019-01-23 PROCEDURE — 97112 NEUROMUSCULAR REEDUCATION: CPT | Performed by: PHYSICAL THERAPIST

## 2019-01-23 PROCEDURE — 97110 THERAPEUTIC EXERCISES: CPT | Performed by: PHYSICAL THERAPIST

## 2019-01-23 NOTE — PROGRESS NOTES
Daily Note     Today's date: 2019  Patient name: Calos Vazquez  : 1975  MRN: [id number]  Referring provider: Wiliam Hoang DO  Dx:   Encounter Diagnosis     ICD-10-CM    1  Rupture of anterior cruciate ligament of left knee, subsequent encounter S83 512D                   Subjective: Pt notes 3/10 knee pain today  Objective: See treatment diary below  Diagnosis: L ACL tear   Precautions: NA   Manuals 19   PROM           IASTM L distal medial HS          IASTM L distal ITB          Re-eval          PF taping/PF offloading CMF   KLS KLS    Exercise Diary          RBC           Alter-G 80% 10 min  72% 10 min  75-80% 10 min    HS S          Heel raises          Clamshells elevated          Leg Press          step ups          wall sit          SLS rebounder       Green 30x foam    Agility ladder      2x each BLEs   Bridge w/ HS curls 2x15         Stool scoots 20'x4       x-walks          SLS star exercise x7 standing   x5 standing     Prone HS curls Fast/slow          Plank          Lateral plank          Lunges Black disc x20   Black disc 15x      SLS            Ball squats on wall      3x10   Bird dips           Pyrometrics 2 feet to step 4'' 2x10       Plymometric hop forward 2 legs x20       Light plyometrics on LP 40# 4x10     30# 3x10    hop fwd/lat Fwd/back x30   pain    Lunge hops 20'x4       SLS 3 way cone exercise    3x  5x    Lateral step downs 2x10 with green T-band for hip ER     20x (cueing with green TBand)   Pt education     15 minutes      Modalities          CP and IFC PRN           Gameready                            Assessment: Cueing required in order to complete pyrometrics with correct technique  Pt does have some discomfort at the R patella tendon with progression of exercises  Plan: Progress treatment as tolerated

## 2019-01-30 ENCOUNTER — OFFICE VISIT (OUTPATIENT)
Dept: PHYSICAL THERAPY | Facility: CLINIC | Age: 44
End: 2019-01-30
Payer: COMMERCIAL

## 2019-01-30 DIAGNOSIS — S83.512D RUPTURE OF ANTERIOR CRUCIATE LIGAMENT OF LEFT KNEE, SUBSEQUENT ENCOUNTER: Primary | ICD-10-CM

## 2019-01-30 DIAGNOSIS — R60.9 PITTING EDEMA: ICD-10-CM

## 2019-01-30 PROCEDURE — 97110 THERAPEUTIC EXERCISES: CPT

## 2019-01-30 PROCEDURE — 97112 NEUROMUSCULAR REEDUCATION: CPT

## 2019-01-30 NOTE — PROGRESS NOTES
Daily Note     Today's date: 2019  Patient name: Feng Gordon  : 1975  MRN: [id number]  Referring provider: Kathleen Morejon DO  Dx:   Encounter Diagnosis     ICD-10-CM    1  Rupture of anterior cruciate ligament of left knee, subsequent encounter S83 512D    2  Pitting edema R60 9                   Subjective: Patient states she had "deep pain" above her knee following last visit which lasted about two days following PT session which made steps difficult to perform  Objective: See treatment diary below  Diagnosis: L ACL tear   Precautions: NA   Manuals 19   PROM              IASTM L distal medial HS             IASTM L distal ITB             Re-eval             PF taping/PF offloading CMF defer   KLS KLS    Exercise Diary             RBC              Alter-G 80% 10 min   85-95% 10 min    72% 10 min  75-80% 10 min    HS S             Heel raises             Clamshells elevated             Leg Press             step ups             wall sit             SLS rebounder          Green 30x foam    Agility ladder         2x each BLEs   Bridge w/ HS curls 2x15           Stool scoots 20'x4 20'x4          x-walks             SLS star exercise x7 standing    x5 standing     Prone HS curls Fast/slow             Plank             Lateral plank             Lunges Black disc x20 Red disc x20    Black disc 15x      SLS               Ball squats on wall         3x10   Bird dips              Plyometrics 2 feet to step 4'' 2x10 8 in 10x         Plymometric hop forward 2 legs x20 6in 10x   8in 10x          Light plyometrics on LP 40# 4x10 60# 3x10      30# 3x10   Hop fwd/lat Fwd/back x30 Fwd/back x30   pain     Lunge hops 20'x4 20'x4         SLS 3 way cone exercise   7x standing   3x  5x    Lateral step downs 2x10 with green T-band for hip ER 2x10 with green band     20x (cueing with green TBand)   Pt education        15 minutes      Modalities             CP and IFC PRN            Gameready                                 Assessment:Continued to progress outlined program  Increased step height for jumps with proper technique and intermittent discomfort, improved landing throughout trials  Mod to max muscle fatigue with quad strengthening exercises  Plan: Progress treatment as tolerated

## 2019-02-06 ENCOUNTER — OFFICE VISIT (OUTPATIENT)
Dept: PHYSICAL THERAPY | Facility: CLINIC | Age: 44
End: 2019-02-06
Payer: COMMERCIAL

## 2019-02-06 DIAGNOSIS — S83.512D RUPTURE OF ANTERIOR CRUCIATE LIGAMENT OF LEFT KNEE, SUBSEQUENT ENCOUNTER: Primary | ICD-10-CM

## 2019-02-06 DIAGNOSIS — R60.9 PITTING EDEMA: ICD-10-CM

## 2019-02-06 PROCEDURE — 97112 NEUROMUSCULAR REEDUCATION: CPT

## 2019-02-06 PROCEDURE — 97110 THERAPEUTIC EXERCISES: CPT

## 2019-02-06 NOTE — PROGRESS NOTES
Daily Note     Today's date: 2019  Patient name: Calos Vazquez  : 1975  MRN: [id number]  Referring provider: Wiliam Hoang DO  Dx:   Encounter Diagnosis     ICD-10-CM    1  Rupture of anterior cruciate ligament of left knee, subsequent encounter S83 512D    2  Pitting edema R60 9                   Subjective: Patient states she has been wearing her brace less while at work which she has felt instability and pain intermittently  She reports her pain has been no less than 2-3/10 without her brace  She states she had one loss of balance but was able to catch herself  She reports wearing high heels while at work  Objective: See treatment diary below  Diagnosis: L ACL tear   Precautions: NA   Manuals    PROM              IASTM L distal medial HS             IASTM patella tendon     KLS       Re-eval             PF taping/PF offloading CMF defer    KLS    Exercise Diary             RBC              Alter-G 80% 10 min   85-95% 10 min  TM 10 min  72% 10 min  75-80% 10 min    HS S             Heel raises             Clamshells elevated             Leg Press             step ups             wall sit             SLS rebounder          Green 30x foam    Agility ladder         2x each BLEs   Bridge w/ HS curls 2x15           Stool scoots 20'x4 20'x4  20'x4       x-walks             SLS star exercise x7 standing   x5 standing  x5 standing     Prone HS curls Fast/slow             Plank             Lateral plank             Lunges Black disc x20 Red disc x20    Black disc 15x      SLS               Ball squats on wall         3x10   Bird dips              Plyometrics 2 feet to step 4'' 2x10 8 in 10x 8 in 20x        Plymometric hop forward 2 legs x20 6in 10x   8in 10x  8 in 20x        Light plyometrics on LP 40# 4x10 60# 3x10  70# 2 x15   30# 3x10   Hop fwd/lat Fwd/back x30 Fwd/back x30 Fwd/bkwd/side x30 pain     Lunge hops 20'x4 20'x4 20'x6       SLS 3 way cone exercise   7x standing 7x standing  3x  5x    Lateral step downs 2x10 with green T-band for hip ER 2x10 with green band 2x10 with green band   20x (cueing with green TBand)   Pt education        15 minutes      Modalities             CP and IFC PRN              Gameready                                 Assessment:Patient was able to begin jogging on TM intermittently; she reports pain near her patella tendon medially  Increased weight and resistance with moderate muscle fatigue  Trialed IASTM to patella tendon with noted tenderness which improved  Plan: Progress treatment as tolerated

## 2019-02-13 ENCOUNTER — OFFICE VISIT (OUTPATIENT)
Dept: PHYSICAL THERAPY | Facility: CLINIC | Age: 44
End: 2019-02-13
Payer: COMMERCIAL

## 2019-02-13 DIAGNOSIS — R60.9 PITTING EDEMA: ICD-10-CM

## 2019-02-13 DIAGNOSIS — S83.512D RUPTURE OF ANTERIOR CRUCIATE LIGAMENT OF LEFT KNEE, SUBSEQUENT ENCOUNTER: Primary | ICD-10-CM

## 2019-02-13 PROCEDURE — 97110 THERAPEUTIC EXERCISES: CPT | Performed by: PHYSICAL THERAPIST

## 2019-02-13 PROCEDURE — 97112 NEUROMUSCULAR REEDUCATION: CPT | Performed by: PHYSICAL THERAPIST

## 2019-02-13 NOTE — PROGRESS NOTES
Daily Note     Today's date: 2019  Patient name: Glen Roca  : 1975  MRN: [id number]  Referring provider: Kimmy Liang DO  Dx:   Encounter Diagnosis     ICD-10-CM    1  Rupture of anterior cruciate ligament of left knee, subsequent encounter S83 512D    2  Pitting edema R60 9                   Subjective: Patient states her knee feels sore and unstable when walking on the ice  Objective: See treatment diary below  Diagnosis: L ACL tear   Precautions: NA   Manuals    PROM              IASTM L distal medial HS             IASTM patella tendon     KLS       Re-eval             PF taping/PF offloading CMF defer    KLS    Exercise Diary             RBC              Alter-G 80% 10 min   85-95% 10 min  TM 10 min  72% 10 min  75-80% 10 min    HS S             Heel raises             Clamshells elevated             Leg Press             step ups             wall sit             SLS rebounder          Green 30x foam    Agility ladder         2x each BLEs   Bridge w/ HS curls 2x15           Stool scoots 20'x4 20'x4  20'x4  20'x4 10#     x-walks             SLS star exercise x7 standing   x5 standing  x5 standing     Prone HS curls Fast/slow             Plank             Lateral plank             Lunges Black disc x20 Red disc x20    Black [id number]      SLS               Ball squats on wall         3x10   Bird dips              Plyometrics 2 feet to step 4'' 2x10 8 in 10x 8 in 20x   8'' x20     Plymometric hop forward 2 legs x20 6in 10x   8in 10x  8 in 20x   8'' x20     Light plyometrics on LP 40# 4x10 60# 3x10  70# 2 x15  75# 2x15 30# 3x10   Hop fwd/lat Fwd/back x30 Fwd/back x30 Fwd/bkwd/side x30 pain     Walking lunbes    20# 20'x2    Lunge hops 20'x4 20'x4 20'x6  20'x6     SLS 3 way cone exercise   7x standing 7x standing  3x  5x    Lateral step downs 2x10 with green T-band for hip ER 2x10 with green band 2x10 with green band  2x10 20x (cueing with green TBand)   Pt education        15 minutes      Modalities             CP and IFC PRN              Gameready                                 Assessment:Patient was able to progress with exercise program with moderate fatigue noted  Good neuromuscular control noted with progression of exercises  Plan: Progress treatment as tolerated

## 2019-02-20 ENCOUNTER — APPOINTMENT (OUTPATIENT)
Dept: PHYSICAL THERAPY | Facility: CLINIC | Age: 44
End: 2019-02-20
Payer: COMMERCIAL

## 2019-02-27 ENCOUNTER — TRANSCRIBE ORDERS (OUTPATIENT)
Dept: PHYSICAL THERAPY | Facility: CLINIC | Age: 44
End: 2019-02-27

## 2019-02-27 ENCOUNTER — EVALUATION (OUTPATIENT)
Dept: PHYSICAL THERAPY | Facility: CLINIC | Age: 44
End: 2019-02-27
Payer: COMMERCIAL

## 2019-02-27 DIAGNOSIS — S83.512D RUPTURE OF ANTERIOR CRUCIATE LIGAMENT OF LEFT KNEE, SUBSEQUENT ENCOUNTER: Primary | ICD-10-CM

## 2019-02-27 DIAGNOSIS — R60.9 PITTING EDEMA: ICD-10-CM

## 2019-02-27 PROCEDURE — 97140 MANUAL THERAPY 1/> REGIONS: CPT | Performed by: PHYSICAL THERAPIST

## 2019-02-27 NOTE — PROGRESS NOTES
PT Re-evaluation    Today's date: 2019  Patient name: Parker Camarena  : 1975  MRN: [id number]  Referring provider: Neil Bianchi DO  Dx:   Encounter Diagnosis     ICD-10-CM    1  Rupture of anterior cruciate ligament of left knee, subsequent encounter S83 512D    2  Pitting edema R60 9                   Assessment  Assessment details: Patient is a 39 y o  female who presents with the above listed impairments  Patient would continue to benefit from skilled PT services to address these impairments and to maximize function  Thank you for the referral     Impairments: abnormal coordination, abnormal muscle firing, activity intolerance, impaired balance, impaired physical strength and pain with function  Understanding of Dx/Px/POC: good   Prognosis: good    Goals  Impairment Goals  - Decrease pain by 50% in 2 weeks  - partially met  - Increase left knee ROM to WNL 2 weeks  - met  - Increase left lower extremity strength golbally to 4/5 in 6 weeks  -  Met  - Increase left lower extremity strength golbally to 5/5 in 6 weeks  - progressing    Functional Goals  - Return to Prior Level of Function in 8 weeks - partially met  - Will be able to ambulate without crutches with normalized gait pattern in 2 weeks  - partially met  - Patient will be independent with HEP in 8 weeks - partially met    Plan  Patient would benefit from: skilled PT  Planned modality interventions: cryotherapy  Planned therapy interventions: joint mobilization, manual therapy, neuromuscular re-education, patient education, strengthening, stretching, therapeutic activities, therapeutic exercise, home exercise program, functional ROM exercises, [place] taping and postural training  Frequency: 2x week (2-3x week)  Duration in weeks: 5  Treatment plan discussed with: patient, PTA and referring physician        Subjective Evaluation    History of Present Illness  Mechanism of injury: Pt notes a 85% improvement to date    She does note note a feeling of instability but still does notes her pain is constant  She notes she will have increased pain with ascending and descending steps  She no longer has reports of pain or stability with walking  She reports she is now able to hop and jog, however does notes some pain with this  She no longer notes "popping" at the knee  Pain  Current pain ratin  At best pain ratin  At worst pain ratin  Location: L knee    Patient Goals  Patient goals for therapy: decreased pain, independence with ADLs/IADLs, return to sport/leisure activities, increased motion, increased strength, decreased edema and improved balance  Patient goal: hiking, stepping up onto a large step        Objective     Observations   Left Knee   Positive for edema and effusion  Tenderness   Left Knee   Tenderness in the medial joint line  No tenderness in the ITB, lateral joint line, lateral retinaculum, LCL (distal), LCL (proximal), MCL (distal), MCL (proximal), patellar tendon and quadriceps tendon  Additional Tenderness Details  L tibia  L medial distal HS is no longer tender  Active Range of Motion   Left Knee   Flexion: 137 degrees   Extension: 0 degrees     Right Knee   Flexion: 138 degrees   Extension: 0 degrees     Passive Range of Motion   Left Knee   Flexion: 138 degrees   Extension: 0 degrees     Strength/Myotome Testing     Additional Strength Details  Quad lag is no longer noted with SLR  4+/5 strength noted grossly LLE  Tests     Left Knee   Positive anterior Lachman  Additional Tests Details        Swelling     Left Knee Girth Measurement (cm)   Joint line: 39 cm  10 cm above joint line: 43 cm  10 cm below joint line: 34 cm    Right Knee Girth Measurement (cm)   Joint line: 39 cm  10 cm above joint line: 44 cm  10 cm below joint line: 35 cm    General Comments:      Knee Comments  Neurovascular intact  Gait is WNL  She is able to hold for 10 seconds            Flowsheet Rows      Most Recent Value   PT/OT G-Codes   Current Score  58   Projected Score  58   FOTO information reviewed  Yes          Daily Treatment Diary   Diagnosis: L ACL tear   Precautions: NA   Manuals 1/23 1/30 2/6 2/13 2/27   PROM             IASTM L distal medial HS            IASTM patella tendon     KLS      Re-eval         CMF   PF taping/PF offloading CMF defer       Exercise Diary            RBC             Alter-G 80% 10 min   85-95% 10 min  TM 10 min  72% 10 min     HS S            Heel raises            Clamshells elevated            Leg Press            step ups            wall sit            SLS rebounder             Agility ladder            Bridge w/ HS curls 2x15          Stool scoots 20'x4 20'x4  20'x4  20'x4 10#    x-walks            SLS star exercise x7 standing   x5 standing  x5 standing    Prone HS curls Fast/slow            Plank            Lateral plank            Lunges Black disc x20 Red disc x20    Black [id number]     SLS              Ball squats on wall            Bird dips             Plyometrics 2 feet to step 4'' 2x10 8 in 10x 8 in 20x   8'' x20    Plymometric hop forward 2 legs x20 6in 10x   8in 10x  8 in 20x   8'' x20    Light plyometrics on LP 40# 4x10 60# 3x10  70# 2 x15  75# 2x15    Hop fwd/lat Fwd/back x30 Fwd/back x30 Fwd/bkwd/side x30 pain    Walking lunbes    20# 20'x2    Lunge hops 20'x4 20'x4 20'x6  20'x6    SLS 3 way cone exercise   7x standing 7x standing  3x     Lateral step downs 2x10 with green T-band for hip ER 2x10 with green band 2x10 with green band  2x10    Pt education        15 minutes     Modalities            CP and IFC PRN             Gameready

## 2019-02-27 NOTE — LETTER
2019    Braden Capps DO  [address] [facility]    Patient: Jose A Coyle   YOB: 1975   Date of Visit: 2019     Encounter Diagnosis     ICD-10-CM    1  Rupture of anterior cruciate ligament of left knee, subsequent encounter S83 512D    2  Pitting edema R60 9        Dear Dr Hernandes Friday:    Please review the attached Plan of Care from [address] [place] [address]'s recent visit  Please verify that you agree therapy should continue by signing the attached document and sending it back to our office  If you have any questions or concerns, please don't hesitate to call  Sincerely,    Tamika Hughes PT      Referring Provider:      I certify that I have read the below Plan of Care and certify the need for these services furnished under this plan of treatment while under my care  Braden Capps   [address] [facility] [address] [address]: [phone]          PT Re-evaluation    Today's date: 2019  Patient name: Jose A Coyle  : 1975  MRN: [id number]  Referring provider: Aguilar Jerome DO  Dx:   Encounter Diagnosis     ICD-10-CM    1  Rupture of anterior cruciate ligament of left knee, subsequent encounter S83 512D    2  Pitting edema R60 9                   Assessment  Assessment details: Patient is a 39 y o  female who presents with the above listed impairments  Patient would continue to benefit from skilled PT services to address these impairments and to maximize function  Thank you for the referral     Impairments: abnormal coordination, abnormal muscle firing, activity intolerance, impaired balance, impaired physical strength and pain with function  Understanding of Dx/Px/POC: good   Prognosis: good    Goals  Impairment Goals  - Decrease pain by 50% in 2 weeks  - partially met  - Increase left knee ROM to WNL 2 weeks  - met  - Increase left lower extremity strength golbally to 4/5 in 6 weeks   -  Met  - Increase left lower extremity strength [employer] to 5/5 in 6 weeks  - progressing    Functional Goals  - Return to Prior Level of Function in 8 weeks - partially met  - Will be able to ambulate without crutches with normalized gait pattern in 2 weeks  - partially met  - Patient will be independent with HEP in 8 weeks - partially met    Plan  Patient would benefit from: skilled PT  Planned modality interventions: cryotherapy  Planned therapy interventions: joint mobilization, manual therapy, neuromuscular re-education, patient education, strengthening, stretching, therapeutic activities, therapeutic exercise, home exercise program, functional ROM exercises, Palma taping and postural training  Frequency: 2x week (2-3x week)  Duration in weeks: 5  Treatment plan discussed with: patient, PTA and referring physician        Subjective Evaluation    History of Present Illness  Mechanism of injury: Pt notes a 85% improvement to date  She does note note a feeling of instability but still does notes her pain is constant  She notes she will have increased pain with ascending and descending steps  She no longer has reports of pain or stability with walking  She reports she is now able to hop and jog, however does notes some pain with this  She no longer notes "popping" at the knee  Pain  Current pain ratin  At best pain ratin  At worst pain ratin  Location: L knee    Patient Goals  Patient goals for therapy: decreased pain, independence with ADLs/IADLs, return to sport/leisure activities, increased motion, increased strength, decreased edema and improved balance  Patient goal: hiking, stepping up onto a large step        Objective     Observations   Left Knee   Positive for edema and effusion  Tenderness   Left Knee   Tenderness in the medial joint line   No tenderness in the ITB, lateral joint line, lateral retinaculum, LCL (distal), LCL (proximal), MCL (distal), MCL (proximal), patellar tendon and quadriceps tendon  Additional Tenderness Details  L tibia  L medial distal HS is no longer tender  Active Range of Motion   Left Knee   Flexion: 137 degrees   Extension: 0 degrees     Right Knee   Flexion: 138 degrees   Extension: 0 degrees     Passive Range of Motion   Left Knee   Flexion: 138 degrees   Extension: 0 degrees     Strength/Myotome Testing     Additional Strength Details  Quad lag is no longer noted with SLR  4+/5 strength noted grossly LLE  Tests     Left Knee   Positive anterior Lachman  Additional Tests Details        Swelling     Left Knee Girth Measurement (cm)   Joint line: 39 cm  10 cm above joint line: 43 cm  10 cm below joint line: 34 cm    Right Knee Girth Measurement (cm)   Joint line: 39 cm  10 cm above joint line: 44 cm  10 cm below joint line: 35 cm    General Comments:      Knee Comments  Neurovascular intact  Gait is WNL  She is able to hold for 10 seconds  Flowsheet Rows      Most Recent Value   PT/OT G-Codes   Current Score  58   Projected Score  58   FOTO information reviewed  Yes          Daily Treatment Diary   Diagnosis: L ACL tear   Precautions: NA   Manuals 1/23 1/30 2/6 2/13 2/27   PROM             IASTM L distal medial HS            IASTM patella tendon     KLS      Re-eval         CMF   PF taping/PF offloading CMF defer       Exercise Diary            RBC             Alter-G 80% 10 min   85-95% 10 min  TM 10 min  72% 10 min     HS S            Heel raises            Clamshells elevated            Leg Press            step ups            wall sit            SLS rebounder             Agility ladder            Bridge w/ HS curls 2x15          Stool scoots 20'x4 20'x4  20'x4  20'x4 10#    x-walks            SLS star exercise x7 standing   x5 standing  x5 standing    Prone HS curls Fast/slow            Plank            Lateral plank            Lunges Black disc x20 Red disc x20    Black [id number]     SLS              Ball squats on wall            Bird dips            Plyometrics 2 feet to step 4'' 2x10 8 in 10x 8 in 20x   8'' x20    Plymometric hop forward 2 legs x20 6in 10x   8in 10x  8 in 20x   8'' x20    Light plyometrics on LP 40# 4x10 60# 3x10  70# 2 x15  75# 2x15    Hop fwd/lat Fwd/back x30 Fwd/back x30 Fwd/bkwd/side x30 pain    Walking lunbes    20# 20'x2    Lunge hops 20'x4 20'x4 20'x6  20'x6    SLS 3 way cone exercise   7x standing 7x standing  3x     Lateral step downs 2x10 with green T-band for hip ER 2x10 with green band 2x10 with green band  2x10    Pt education        15 minutes     Modalities            CP and IFC PRN             [employer]

## 2019-03-06 ENCOUNTER — OFFICE VISIT (OUTPATIENT)
Dept: PHYSICAL THERAPY | Facility: CLINIC | Age: 44
End: 2019-03-06
Payer: COMMERCIAL

## 2019-03-06 DIAGNOSIS — S83.512D RUPTURE OF ANTERIOR CRUCIATE LIGAMENT OF LEFT KNEE, SUBSEQUENT ENCOUNTER: Primary | ICD-10-CM

## 2019-03-06 PROCEDURE — 97112 NEUROMUSCULAR REEDUCATION: CPT | Performed by: PHYSICAL THERAPIST

## 2019-03-06 NOTE — PROGRESS NOTES
Daily Note     Today's date: 3/6/2019  Patient name: Marcio Chandler  : 1975  MRN: [id number]  Referring provider: Santy Abdul DO  Dx:   Encounter Diagnosis     ICD-10-CM    1  Rupture of anterior cruciate ligament of left knee, subsequent encounter S83 512D                   Subjective: Pt notes she is a little sore today  Objective: See treatment diary below  Diagnosis: L ACL tear   Precautions: NA   Manuals 3/6                                               Exercise Diary            TM 1 5 run, 1 min walk, 6 min  total               Reverse lunges  20'x4       Step-ups 12'' 2x10       Step-ups (climbing) 24'' 2x10 on each side       X-walks Black 20'x6       Step-downs 12'' x10 each       Step-over on BOSU x10 each       Step-downs onto BOSU 12'' L only x10                                                                                                                                                                                                                     Assessment: Advanced with functional strengthening exercises  Mild increase of sxs noted  Good form noted throughout all exercises  Plan: Continue per plan of care

## 2019-03-13 ENCOUNTER — OFFICE VISIT (OUTPATIENT)
Dept: PHYSICAL THERAPY | Facility: CLINIC | Age: 44
End: 2019-03-13
Payer: COMMERCIAL

## 2019-03-13 DIAGNOSIS — R60.9 PITTING EDEMA: ICD-10-CM

## 2019-03-13 DIAGNOSIS — S83.512D RUPTURE OF ANTERIOR CRUCIATE LIGAMENT OF LEFT KNEE, SUBSEQUENT ENCOUNTER: Primary | ICD-10-CM

## 2019-03-13 PROCEDURE — 97110 THERAPEUTIC EXERCISES: CPT | Performed by: PHYSICAL THERAPIST

## 2019-03-13 PROCEDURE — 97112 NEUROMUSCULAR REEDUCATION: CPT | Performed by: PHYSICAL THERAPIST

## 2019-03-13 NOTE — PROGRESS NOTES
Daily Note     Today's date: 3/13/2019  Patient name: Jaiden Matson  : 1975  MRN: [id number]  Referring provider: Brandy PresidentDO  Dx:   Encounter Diagnosis     ICD-10-CM    1  Rupture of anterior cruciate ligament of left knee, subsequent encounter S83 512D    2  Pitting edema R60 9                   Subjective: Pt notes she has been wearing high heels all day and notes increased sxs at the L patella which she rates as a 3 5/10  Objective: See treatment diary below  Diagnosis: L ACL tear   Precautions: NA   Manuals 3/6 3/13      Patella tendon taping S  CMF                                      Exercise Diary            TM 1 5 run, 1 min walk, 6 min  total 1 5 run, 1 min walk, 6 min  total              Reverse lunges  20'x4 20'x6      Step-ups 12'' 2x10 D/c      Step-ups (climbing) 24'' 2x10 on each side 24'' 2x10 each      X-walks Black 20'x6 Black 20'x6      Step-downs 12'' x10 each 24'' x10 each      Step-over on BOSU x10 each  2x10      Step-downs onto BOSU 12'' L only x10 12'' 2x10      Leg press plyometrics 2 off and land on 1  45# 2x15                                                                                                                                                                                                            Assessment: Advanced with functional strengthening exercises  Pt did experience discomfort with step downs  Plan: Continue per plan of care

## 2019-03-18 ENCOUNTER — APPOINTMENT (OUTPATIENT)
Dept: PHYSICAL THERAPY | Facility: CLINIC | Age: 44
End: 2019-03-18
Payer: COMMERCIAL

## 2019-03-27 ENCOUNTER — APPOINTMENT (OUTPATIENT)
Dept: PHYSICAL THERAPY | Facility: CLINIC | Age: 44
End: 2019-03-27
Payer: COMMERCIAL

## 2019-04-03 ENCOUNTER — EVALUATION (OUTPATIENT)
Dept: PHYSICAL THERAPY | Facility: CLINIC | Age: 44
End: 2019-04-03
Payer: COMMERCIAL

## 2019-04-03 DIAGNOSIS — S83.512D RUPTURE OF ANTERIOR CRUCIATE LIGAMENT OF LEFT KNEE, SUBSEQUENT ENCOUNTER: Primary | ICD-10-CM

## 2019-04-03 PROCEDURE — 97140 MANUAL THERAPY 1/> REGIONS: CPT | Performed by: PHYSICAL THERAPIST

## 2019-04-03 PROCEDURE — 97112 NEUROMUSCULAR REEDUCATION: CPT | Performed by: PHYSICAL THERAPIST

## 2019-10-24 ENCOUNTER — OFFICE VISIT (OUTPATIENT)
Dept: OBGYN CLINIC | Facility: OTHER | Age: 44
End: 2019-10-24
Payer: COMMERCIAL

## 2019-10-24 ENCOUNTER — APPOINTMENT (OUTPATIENT)
Dept: RADIOLOGY | Facility: OTHER | Age: 44
End: 2019-10-24
Payer: COMMERCIAL

## 2019-10-24 VITALS
BODY MASS INDEX: 26.56 KG/M2 | WEIGHT: 169.2 LBS | HEIGHT: 67 IN | HEART RATE: 78 BPM | SYSTOLIC BLOOD PRESSURE: 112 MMHG | DIASTOLIC BLOOD PRESSURE: 64 MMHG

## 2019-10-24 DIAGNOSIS — M22.2X1 PATELLOFEMORAL PAIN SYNDROME OF BOTH KNEES: Primary | ICD-10-CM

## 2019-10-24 DIAGNOSIS — M25.561 RIGHT KNEE PAIN, UNSPECIFIED CHRONICITY: ICD-10-CM

## 2019-10-24 DIAGNOSIS — M22.2X2 PATELLOFEMORAL PAIN SYNDROME OF BOTH KNEES: Primary | ICD-10-CM

## 2019-10-24 PROCEDURE — 99213 OFFICE O/P EST LOW 20 MIN: CPT | Performed by: ORTHOPAEDIC SURGERY

## 2019-10-24 PROCEDURE — 73562 X-RAY EXAM OF KNEE 3: CPT

## 2019-10-24 RX ORDER — TRIAMCINOLONE ACETONIDE 55 UG/1
SPRAY, METERED NASAL AS NEEDED
COMMUNITY

## 2019-10-24 NOTE — PROGRESS NOTES
Assessment  Diagnoses and all orders for this visit:    Patellofemoral pain syndrome of both knees        Discussion and Plan:    · Explained to the patient that she has an examination and symptoms that are consistent with bilateral patellofemoral syndrome  Instructed the patient that the conservative treatments for this diagnosis include a course of formal physical therapy and home exercise program and activity modification as needed  May take OTC anti-inflammatories p r n  for pain relief  · She will follow up in 2 months for re evaluation of her symptoms with Migdalia Kolb PA-C  If symptoms persist will consider MRI on more symptomatic knee at this time to evaluate for possible internal derangement  · Her left knee does have an injury to the ACL and while there is mild increased translation with Lachman she does not have overt instability in the symptoms she is complaining about are not consistent with instability of the left knee hence I do not feel aggressive surgical treatment of the ACL deficiency is indicated at this time  Subjective:   Patient ID: Sangita Lopez is a 38 y o  female      The patient presents with a chief complaint of bilateral knee pain  The pain began 2 month(s) ago and is not associated with an acute injury  Patient does states that she began to notice pain in September when she went to get into bed  The patient describes the pain as aching and dull and 5 out of 10 in intensity  It is intermittent, occurring with increasing frequency in timing, and localizes the pain to the anterior medial joint line, anterior lateral joint line  The pain is worse with activities, walking and squatting and relieved with rest, ice, avoiding painful activities  She denies mechanical symptoms such as locking and catching  She denies instability of the knee    Patient has had treatment for her left knee in the form of formal physical therapy and a home exercise program for a partially torn ACL treated for in 1/2019  The following portions of the patient's history were reviewed and updated as appropriate: allergies, current medications, past family history, past medical history, past social history, past surgical history and problem list     Review of Systems   Constitutional: Negative for chills, fever and unexpected weight change  HENT: Negative for hearing loss, nosebleeds and sore throat  Eyes: Negative for pain, redness and visual disturbance  Respiratory: Negative for cough, shortness of breath and wheezing  Cardiovascular: Negative for chest pain, palpitations and leg swelling  Gastrointestinal: Negative for abdominal distention, nausea and vomiting  Endocrine: Negative for polydipsia and polyuria  Genitourinary: Negative for dysuria and hematuria  Skin: Negative for rash and wound  Neurological: Negative for dizziness, numbness and headaches  Psychiatric/Behavioral: Negative for decreased concentration and suicidal ideas  Objective:  /64   Pulse 78   Ht 5' 7" (1 702 m)   Wt 76 7 kg (169 lb 3 2 oz)   BMI 26 50 kg/m²       Right Knee Exam     Tenderness   Right knee tenderness location: anterolateral joint line  Range of Motion   The patient has normal right knee ROM  Tests   Rachel:  Medial - negative   Varus: negative Valgus: negative  Lachman:  Anterior - negative        Other   Erythema: absent  Sensation: normal  Pulse: present  Effusion: no effusion present      Left Knee Exam     Tenderness   Left knee tenderness location: anteromedial joint line  Range of Motion   The patient has normal left knee ROM  Tests   Rachel:  Medial - negative   Varus: negative Valgus: negative  Lachman:  Anterior - negative        Other   Erythema: absent  Sensation: normal  Pulse: present  Effusion: no effusion present            Physical Exam   Constitutional: She is oriented to person, place, and time   She appears well-developed and well-nourished  Eyes: Pupils are equal, round, and reactive to light  Pulmonary/Chest: Effort normal and breath sounds normal    Musculoskeletal:        Right knee: She exhibits no effusion  Left knee: She exhibits no effusion  Neurological: She is alert and oriented to person, place, and time  Skin: Skin is warm and dry  Psychiatric: She has a normal mood and affect  Her behavior is normal  Judgment and thought content normal          I have personally reviewed pertinent films in PACS and my interpretation is as follows  X Ray Right Knee: Mild medial compartment osteoarthritis  No acute osseous abnormality       Scribe Attestation    I,:   Tereso Johns am acting as a scribe while in the presence of the attending physician :        I,:   Shalonda Qureshi MD personally performed the services described in this documentation    as scribed in my presence :

## 2019-10-30 ENCOUNTER — EVALUATION (OUTPATIENT)
Dept: PHYSICAL THERAPY | Facility: CLINIC | Age: 44
End: 2019-10-30
Payer: COMMERCIAL

## 2019-10-30 DIAGNOSIS — M22.2X1 PATELLOFEMORAL PAIN SYNDROME OF BOTH KNEES: ICD-10-CM

## 2019-10-30 DIAGNOSIS — M22.2X2 PATELLOFEMORAL PAIN SYNDROME OF BOTH KNEES: ICD-10-CM

## 2019-10-30 PROCEDURE — 97162 PT EVAL MOD COMPLEX 30 MIN: CPT | Performed by: PHYSICAL THERAPIST

## 2019-10-30 PROCEDURE — 97140 MANUAL THERAPY 1/> REGIONS: CPT | Performed by: PHYSICAL THERAPIST

## 2019-10-30 NOTE — PROGRESS NOTES
PT Evaluation     Today's date: 10/30/2019  Patient name: Bryan Peoples  : 1975  MRN: [id number]  Referring provider: Main Lazo MD  Dx:   Encounter Diagnosis     ICD-10-CM    1  Patellofemoral pain syndrome of both knees M22 2X1 Ambulatory referral to Physical Therapy    M22 2X2                   Assessment  Assessment details: Patient is a 38 y o  female who presents with the above listed impairments  Patient would benefit from skilled PT services to address these impairments and to maximize function  Thank you for the referral     Impairments: activity intolerance, impaired physical strength, lacks appropriate home exercise program, pain with function and weight-bearing intolerance  Understanding of Dx/Px/POC: good   Prognosis: good    Goals  Impairment Goals  - Decrease pain by 50% in 4 weeks  - Increase bilateral flexibility by 50% in 4 weeks  - Increase bilateral lower extremity strength globally to 5/5 in 4 weeks  Functional Goals  - Return to Prior Level of Function in 6 weeks  - Patient will be independent with HEP in 6 weeks    Plan  Patient would benefit from: skilled PT  Planned modality interventions: cryotherapy  Planned therapy interventions: joint mobilization, manual therapy, neuromuscular re-education, patient education, strengthening, stretching, therapeutic activities, therapeutic exercise, home exercise program, functional ROM exercises, Palma taping and postural training  Frequency: 2x week (2-3x week)  Duration in weeks: 6  Treatment plan discussed with: patient, PTA and referring physician        Subjective Evaluation    History of Present Illness  Mechanism of injury: Patient reports ongoing L knee pain for several months  We did see her last year after suffering a partial ACL tear  She did rehab and did very well with this  She was able to return to PLOF    However, she does reports if she performs an activates for a prolonged periods of time she will have increased pain pain  Sitting, standing, walking for a prolonged time will increase her sxs  As far as her R knee goes, she states this has been occurring for the past 2 months and is sharp in nature  She has pain with kneeling down and getting up into bed  She denies any MAGAN  Pt denies any radicular sxs or issues with B/B control  She denies any locking of B knees  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  PLOF:  Independent   L knee pain:  5/10 currently, 0/10 at best, and 8/10 at worse  Pain  Current pain ratin  At best pain ratin  At worst pain ratin  Location: R           Objective     Tenderness   Left Knee   Tenderness in the patellar tendon  No tenderness in the lateral joint line and medial joint line  Right Knee   Tenderness in the lateral joint line  No tenderness in the medial joint line  Neurological Testing     Sensation     Knee   Left Knee   Intact: light touch    Right Knee   Intact: light touch     Reflexes   Left   Patellar (L4): normal (2+)  Achilles (S1): normal (2+)    Right   Patellar (L4): normal (2+)  Achilles (S1): normal (2+)    Active Range of Motion   Left Knee   Normal active range of motion    Right Knee   Normal active range of motion    Patellar Static Positioning   Left Knee: baja  Right Knee: [facility]    Strength/Myotome Testing     Additional Strength Details  4/5 strength noted at the R quad  4+/5 noted at the B hip abd and ER and HS   5/5 noted throughout the rest of the BLE  Tests     Left Knee   Negative lateral Rachel and medial Rachel  Right Knee   Negative lateral Rachel and medial Rachel       Additional Tests Details  Shannan's was - on the bilateral   Earlene Mano test was + for decreased hip and quadriceps flexibility on the bilateral   Hamstring length was 0 degrees from 0 when placed in 90/90 position on the bilateral   Obers was - on the bilateral   Nobels was - on the bilateral       General Comments:      Knee Comments  LQS was negative  Gait was WNL               Diagnosis: B knee pain - L knee patella baja   Precautions: -   Manuals 10/30       Patella tendon S and tape CMF       Tibia ER mobs R                        Exercise Diary        Bike                Leg press        Lateral step-downs        lunges        Wall sits                Fire hydrants                Quad S B                                                                                                Modalities             CP PRN

## 2019-10-31 ENCOUNTER — APPOINTMENT (OUTPATIENT)
Dept: PHYSICAL THERAPY | Facility: CLINIC | Age: 44
End: 2019-10-31
Payer: COMMERCIAL

## 2019-11-04 ENCOUNTER — OFFICE VISIT (OUTPATIENT)
Dept: PHYSICAL THERAPY | Facility: CLINIC | Age: 44
End: 2019-11-04
Payer: COMMERCIAL

## 2019-11-04 DIAGNOSIS — M22.2X1 PATELLOFEMORAL PAIN SYNDROME OF BOTH KNEES: Primary | ICD-10-CM

## 2019-11-04 DIAGNOSIS — M22.2X2 PATELLOFEMORAL PAIN SYNDROME OF BOTH KNEES: Primary | ICD-10-CM

## 2019-11-04 PROCEDURE — 97140 MANUAL THERAPY 1/> REGIONS: CPT

## 2019-11-04 PROCEDURE — 97110 THERAPEUTIC EXERCISES: CPT

## 2019-11-04 PROCEDURE — 97112 NEUROMUSCULAR REEDUCATION: CPT

## 2019-11-04 NOTE — PROGRESS NOTES
Daily Note     Today's date: 2019  Patient name: Charisse Ramirez  : 1975  MRN: [id number]  Referring provider: Kan Tucker MD  Dx:   Encounter Diagnosis     ICD-10-CM    1  Patellofemoral pain syndrome of both knees M22 2X1     M22 2X2                   Subjective: Patient states the tape did not help last visit because it fell off within several hours  She states she realizes how her symptoms began, she was squatting and pushing a 4-500 lb tote that had her Halloween decorations in        Objective: See treatment diary below      Assessment: Initiated outlined program  Patient was able to perform exercises as noted  Extensive education required throughout session  Patient experiences pain on LP, trialed patella taping technique as she was able to perform without pain  Noted hip IR with lateral step downs, cueing to perform correctly and maintain technique  Updated HEP with good understanding  Plan: Progress treatment as tolerated         Diagnosis: B knee pain - L knee patella baja   Precautions: -   Manuals 10/30 11/      Patella tendon S and tape CMF KLS      Tibia ER mobs R                        Exercise Diary        Bike  5 min               Leg press  90# 2x10       Lateral step-downs  8in 2x10       lunges  10x each       Wall sits  :30x4               Fire hydrants  0# 2x10 each               Quad S B  :20x5                                                                                               Modalities             CP PRN

## 2019-11-11 ENCOUNTER — OFFICE VISIT (OUTPATIENT)
Dept: PHYSICAL THERAPY | Facility: CLINIC | Age: 44
End: 2019-11-11
Payer: COMMERCIAL

## 2019-11-11 DIAGNOSIS — M22.2X1 PATELLOFEMORAL PAIN SYNDROME OF BOTH KNEES: Primary | ICD-10-CM

## 2019-11-11 DIAGNOSIS — M22.2X2 PATELLOFEMORAL PAIN SYNDROME OF BOTH KNEES: Primary | ICD-10-CM

## 2019-11-11 PROCEDURE — 97140 MANUAL THERAPY 1/> REGIONS: CPT

## 2019-11-11 PROCEDURE — 97112 NEUROMUSCULAR REEDUCATION: CPT

## 2019-11-11 PROCEDURE — 97110 THERAPEUTIC EXERCISES: CPT

## 2019-11-11 NOTE — PROGRESS NOTES
Daily Note     Today's date: 2019  Patient name: Luis Armando Hendricks  : 1975  MRN: [id number]  Referring provider: Sam Vargas MD  Dx:   Encounter Diagnosis     ICD-10-CM    1  Patellofemoral pain syndrome of both knees M22 2X1     M22 2X2                   Subjective: Patient states the tape really helped a lot  She notes having "like no pain" when wearing the tape, and she tried performing lunges without the tape in which she had increase pain  Objective: See treatment diary below      Assessment: Continued with outlined program  Noted IR with RLE; cued to maintain ER with lateral step downs and initiation of star excursion  Moderate muscle fatigue  No pain with taping techniques  Plan: Progress treatment as tolerated         Diagnosis: B knee pain - L knee patella baja   Precautions: -   Manuals 10/30 11/4 11/11     Patella tendon S and tape CMF KLS KLS     Tibia ER mobs R        IASTM    KLS              Exercise Diary        Bike  5 min  5 min level 4             Leg press  90# 2x10  90# 2x15     Lateral step-downs  8in 2x10  8in 2x10     lunges  10x each  10x each      Wall sits  :30x4  :30x4             Fire hydrants  0# 2x10 each  0# 3x10 each             Quad S B  :20x5       Star Excursion    3x each                                                                                      Modalities             CP PRN

## 2019-11-15 ENCOUNTER — OFFICE VISIT (OUTPATIENT)
Dept: PHYSICAL THERAPY | Facility: CLINIC | Age: 44
End: 2019-11-15
Payer: COMMERCIAL

## 2019-11-15 DIAGNOSIS — M22.2X1 PATELLOFEMORAL PAIN SYNDROME OF BOTH KNEES: Primary | ICD-10-CM

## 2019-11-15 DIAGNOSIS — M22.2X2 PATELLOFEMORAL PAIN SYNDROME OF BOTH KNEES: Primary | ICD-10-CM

## 2019-11-15 PROCEDURE — 97110 THERAPEUTIC EXERCISES: CPT

## 2019-11-15 PROCEDURE — 97140 MANUAL THERAPY 1/> REGIONS: CPT

## 2019-11-15 PROCEDURE — 97112 NEUROMUSCULAR REEDUCATION: CPT

## 2019-11-15 NOTE — PROGRESS NOTES
Daily Note     Today's date: 11/15/2019  Patient name: Genevieve Sinclair  : 1975  MRN: [id number]  Referring provider: Debbie Newton MD  Dx:   Encounter Diagnosis     ICD-10-CM    1  Patellofemoral pain syndrome of both knees M22 2X1     M22 2X2                   Subjective: Patient states she was taped while walking about [facility] which really helped her pain, but when her tape was off she noticed the difference and change in fatigue and pain  Objective: See treatment diary below      Assessment: Continued with outlined program  She demonstrates improved tolerance to wall sits with modification to form to decrease "pulling" at quad insertion around knee  No pain symptoms with application of tape  Plan: Progress treatment as tolerated         Diagnosis: B knee pain - L knee patella baja   Precautions: -   Manuals 10/30 11/4 11/11 11/15    Patella tendon S and tape CMF KLS KLS KLS    Tibia ER mobs R        IASTM    KLS  KLS            Exercise Diary        Bike  5 min  5 min level 4 5 min level 4             Leg press  90# 2x10  90# 2x15 # 2x15     Lateral step-downs  8in 2x10  8in 2x10 8in 2x10     lunges  10x each  10x each  10x each     Wall sits  :30x4  :30x4 :30x4             Fire hydrants  0# 2x10 each  0# 3x10 each 0# 3x10 each            Quad S B  :20x5       Star Excursion    3x each  3x each                                                                                     Modalities             CP PRN

## 2019-11-20 ENCOUNTER — OFFICE VISIT (OUTPATIENT)
Dept: PHYSICAL THERAPY | Facility: CLINIC | Age: 44
End: 2019-11-20
Payer: COMMERCIAL

## 2019-11-20 DIAGNOSIS — M22.2X2 PATELLOFEMORAL PAIN SYNDROME OF BOTH KNEES: Primary | ICD-10-CM

## 2019-11-20 DIAGNOSIS — M22.2X1 PATELLOFEMORAL PAIN SYNDROME OF BOTH KNEES: Primary | ICD-10-CM

## 2019-11-20 PROCEDURE — 97110 THERAPEUTIC EXERCISES: CPT | Performed by: PHYSICAL THERAPIST

## 2019-11-20 PROCEDURE — 97112 NEUROMUSCULAR REEDUCATION: CPT | Performed by: PHYSICAL THERAPIST

## 2019-11-20 PROCEDURE — 97140 MANUAL THERAPY 1/> REGIONS: CPT | Performed by: PHYSICAL THERAPIST

## 2019-11-20 NOTE — PROGRESS NOTES
Daily Note     Today's date: 2019  Patient name: Charisse Ramirez  : 1975  MRN: [id number]  Referring provider: Kan Tucker MD  Dx:   Encounter Diagnosis     ICD-10-CM    1  Patellofemoral pain syndrome of both knees M22 2X1     M22 2X2                   Subjective: Patient notes an overall improvement with the R knee but I still having some pain on the L knee  Objective: See treatment diary below      Assessment: Continued with outlined program  Moderate fatigue noted with progression of exercises  McConnel tape allowed pt to perform exercises pain free  Plan: Progress treatment as tolerated         Diagnosis: B knee pain - L knee patella baja   Precautions: -   Manuals 10/30 11/4 11/11 11/15 11/20   Patella tendon S and tape CMF KLS KLS KLS CMF   Tibia ER mobs R     CMF   IASTM    KLS  KLS CMF           Exercise Diary        Bike  5 min  5 min level 4 5 min level 4  5 min            Leg press  90# 2x10  90# 2x15 # 2x15  110# 2x15   Lateral step-downs  8in 2x10  8in 2x10 8in 2x10  8'' 2x15   lunges  10x each  10x each  10x each  20# 1x10   Wall sits  :30x4  :30x4 :30x4  :30x4           Fire hydrants  0# 2x10 each  0# 3x10 each 0# 3x10 each 2# x15 each           Quad S B  :20x5       Star Excursion    3x each  3x each  x3 each                                                                                   Modalities             CP PRN

## 2019-11-22 ENCOUNTER — APPOINTMENT (OUTPATIENT)
Dept: PHYSICAL THERAPY | Facility: CLINIC | Age: 44
End: 2019-11-22
Payer: COMMERCIAL

## 2019-11-25 ENCOUNTER — OFFICE VISIT (OUTPATIENT)
Dept: PHYSICAL THERAPY | Facility: CLINIC | Age: 44
End: 2019-11-25
Payer: COMMERCIAL

## 2019-11-25 DIAGNOSIS — M22.2X1 PATELLOFEMORAL PAIN SYNDROME OF BOTH KNEES: Primary | ICD-10-CM

## 2019-11-25 DIAGNOSIS — M22.2X2 PATELLOFEMORAL PAIN SYNDROME OF BOTH KNEES: Primary | ICD-10-CM

## 2019-11-25 PROCEDURE — 97112 NEUROMUSCULAR REEDUCATION: CPT | Performed by: PHYSICAL THERAPIST

## 2019-11-25 PROCEDURE — 97140 MANUAL THERAPY 1/> REGIONS: CPT | Performed by: PHYSICAL THERAPIST

## 2019-11-25 PROCEDURE — 97110 THERAPEUTIC EXERCISES: CPT | Performed by: PHYSICAL THERAPIST

## 2019-11-25 NOTE — PROGRESS NOTES
Daily Note     Today's date: 2019  Patient name: Ketan Asencio  : 1975  MRN: [id number]  Referring provider: Isidoro Leon MD  Dx:   Encounter Diagnosis     ICD-10-CM    1  Patellofemoral pain syndrome of both knees M22 2X1     M22 2X2                   Subjective: Patient notes she did have increased pain later in the day after her last visit  This abolished in 24 hours  Objective: See treatment diary below      Assessment: Continued with outlined program  No increase of sxs noted with exercises  Plan: Progress treatment as tolerated  Diagnosis: B knee pain - L knee patella baja   Precautions: -   Manuals 11/25 11/4 11/11 11/15 11/20   Patella tendon S and tape CMF KLS KLS KLS CMF   Tibia ER mobs R     CMF   IASTM  -  KLS  KLS CMF           Exercise Diary        Bike 5 min   5 min  5 min level 4 5 min level 4  5 min            Leg press  90# 2x10  90# 2x15 # 2x15  110# 2x15   Lateral step-downs 8'' 2x15 8in 2x10  8in 2x10 8in 2x10  8'' 2x15   lunges 20# 1x12 10x each  10x each  10x each  20# 1x10   Wall sits :30x4 :30x4  :30x4 :30x4  :30x4           Fire hydrants 0# 2x15 0# 2x10 each  0# 3x10 each 0# 3x10 each 2# x15 each           Quad S B  :20x5       Star Excursion  x3 each  3x each  3x each  x3 each   Stool scoots 5# 20'x2 each                                                                               Modalities             CP PRN

## 2019-11-29 ENCOUNTER — OFFICE VISIT (OUTPATIENT)
Dept: PHYSICAL THERAPY | Facility: CLINIC | Age: 44
End: 2019-11-29
Payer: COMMERCIAL

## 2019-11-29 DIAGNOSIS — M22.2X2 PATELLOFEMORAL PAIN SYNDROME OF BOTH KNEES: Primary | ICD-10-CM

## 2019-11-29 DIAGNOSIS — M22.2X1 PATELLOFEMORAL PAIN SYNDROME OF BOTH KNEES: Primary | ICD-10-CM

## 2019-11-29 PROCEDURE — 97110 THERAPEUTIC EXERCISES: CPT

## 2019-11-29 PROCEDURE — 97140 MANUAL THERAPY 1/> REGIONS: CPT

## 2019-11-29 PROCEDURE — 97112 NEUROMUSCULAR REEDUCATION: CPT

## 2019-11-29 NOTE — PROGRESS NOTES
Daily Note     Today's date: 2019  Patient name: Shahrzad Charles  : 1975  MRN: [id number]  Referring provider: Sean Rodriguez MD  Dx:   Encounter Diagnosis     ICD-10-CM    1  Patellofemoral pain syndrome of both knees M22 2X1     M22 2X2                   Subjective: Patient states she has been feeling pretty good  She has discomfort when taking off the tape  She purchased the tape for home use  Objective: See treatment diary below      Assessment: Continued with outlined program  Educated in taping for home, will continue to assess and assist  She demonstrates improved tolerance to LE strengthening exercises  Less cueing for hip ER with lateral step downs  Plan: Progress treatment as tolerated  Diagnosis: B knee pain - L knee patella baja   Precautions: -   Manuals 11/25 11/29  11/15 11/20   Patella tendon S and tape CMF KLS  KLS CMF   Tibia ER mobs R     CMF   IASTM  -   KLS CMF           Exercise Diary        Bike 5 min   5 min   5 min level 4  5 min            Leg press  110# 2x15   # 2x15  110# 2x15   Lateral step-downs 8'' 2x15 8in 2x10   8in 2x10  8'' 2x15   lunges 20# 1x12 10x each   10x each  20# 1x10   Wall sits :30x4 :30x4   :30x4  :30x4           Fire hydrants 0# 2x15 0# 2x10 each   0# 3x10 each 2# x15 each           Quad S B  :20x5       Star Excursion  x3 each x5 each   3x each  x3 each   Stool scoots 5# 20'x2 each 5# 20'x4                                                                          Modalities            CP PRN

## 2019-12-02 ENCOUNTER — APPOINTMENT (OUTPATIENT)
Dept: PHYSICAL THERAPY | Facility: CLINIC | Age: 44
End: 2019-12-02
Payer: COMMERCIAL

## 2019-12-04 ENCOUNTER — APPOINTMENT (OUTPATIENT)
Dept: PHYSICAL THERAPY | Facility: CLINIC | Age: 44
End: 2019-12-04
Payer: COMMERCIAL

## 2019-12-09 ENCOUNTER — OFFICE VISIT (OUTPATIENT)
Dept: PHYSICAL THERAPY | Facility: CLINIC | Age: 44
End: 2019-12-09
Payer: COMMERCIAL

## 2019-12-09 ENCOUNTER — APPOINTMENT (OUTPATIENT)
Dept: PHYSICAL THERAPY | Facility: CLINIC | Age: 44
End: 2019-12-09
Payer: COMMERCIAL

## 2019-12-09 DIAGNOSIS — M22.2X2 PATELLOFEMORAL PAIN SYNDROME OF BOTH KNEES: Primary | ICD-10-CM

## 2019-12-09 DIAGNOSIS — M22.2X1 PATELLOFEMORAL PAIN SYNDROME OF BOTH KNEES: Primary | ICD-10-CM

## 2019-12-09 PROCEDURE — 97140 MANUAL THERAPY 1/> REGIONS: CPT | Performed by: PHYSICAL THERAPIST

## 2019-12-09 PROCEDURE — 97112 NEUROMUSCULAR REEDUCATION: CPT | Performed by: PHYSICAL THERAPIST

## 2019-12-09 PROCEDURE — 97110 THERAPEUTIC EXERCISES: CPT | Performed by: PHYSICAL THERAPIST

## 2019-12-09 NOTE — PROGRESS NOTES
Daily Note     Today's date: 2019  Patient name: Earlene Madsen  : 1975  MRN: [id number]  Referring provider: Mateus Tan MD  Dx:   Encounter Diagnosis     ICD-10-CM    1  Patellofemoral pain syndrome of both knees M22 2X1     M22 2X2                   Subjective: Patient reports she does have some pain at the L hip over the weekend which has been improving  Objective: See treatment diary below      Assessment: Continued with outlined program  Pt tender at the proximal TFL on the L   ITB and hip flex  S performed manually and given for HEP  Plan: Progress treatment as tolerated  Diagnosis: B knee pain - L knee patella baja   Precautions: -   Manuals 11/25 11/29 12/9 11/15 11/20   Patella tendon S and tape CMF KLS CMF KLS CMF   Tibia ER mobs R     CMF   IASTM  -   KLS CMF   ITB and quad S   CMF     Exercise Diary        Bike 5 min  5 min  5 min   5 min level 4  5 min            Leg press  110# 2x15  110# 2x15 # 2x15  110# 2x15   Lateral step-downs 8'' 2x15 8in 2x10  8'' 2x10 8in 2x10  8'' 2x15   lunges 20# 1x12 10x each  x10 each 10x each  20# 1x10   Wall sits :30x4 :30x4  :30x4 :30x4  :30x4           Fire hydrants 0# 2x15 0# 2x10 each  2# 2x10 0# 3x10 each 2# x15 each           Quad S B  :20x5       Star Excursion  x3 each x5 each  x5 each 3x each  x3 each   Stool scoots 5# 20'x2 each 5# 20'x4 5# 20'x4                                                                         Modalities            CP PRN

## 2019-12-23 ENCOUNTER — OFFICE VISIT (OUTPATIENT)
Dept: PHYSICAL THERAPY | Facility: CLINIC | Age: 44
End: 2019-12-23
Payer: COMMERCIAL

## 2019-12-23 DIAGNOSIS — M22.2X1 PATELLOFEMORAL PAIN SYNDROME OF BOTH KNEES: Primary | ICD-10-CM

## 2019-12-23 DIAGNOSIS — M22.2X2 PATELLOFEMORAL PAIN SYNDROME OF BOTH KNEES: Primary | ICD-10-CM

## 2019-12-23 PROCEDURE — 97112 NEUROMUSCULAR REEDUCATION: CPT | Performed by: PHYSICAL THERAPIST

## 2019-12-23 PROCEDURE — 97140 MANUAL THERAPY 1/> REGIONS: CPT | Performed by: PHYSICAL THERAPIST

## 2019-12-23 PROCEDURE — 97110 THERAPEUTIC EXERCISES: CPT | Performed by: PHYSICAL THERAPIST

## 2019-12-23 NOTE — PROGRESS NOTES
PT Re-evaluation    Today's date: 2019  Patient name: Moon Arias  : 1975  MRN: [id number]  Referring provider: Michael Calderón MD  Dx:   Encounter Diagnosis     ICD-10-CM    1  Patellofemoral pain syndrome of both knees M22 2X1     M22 2X2                   Assessment  Assessment details: Patient is a 38 y o  female who presents with an improvement of the above listed impairments  Patient would continue benefit from skilled PT services to address these impairments and to maximize function  We will begin to progress toward d/c  Thank you for the referral     Impairments: activity intolerance, impaired physical strength and pain with function  Understanding of Dx/Px/POC: good   Prognosis: good    Goals  Impairment Goals  - Decrease pain by 50% in 4 weeks  - met  - Increase bilateral flexibility by 50% in 4 weeks  - progressing  - Increase bilateral lower extremity strength globally to 5/5 in 4 weeks  - partially met     Functional Goals  - Return to Prior Level of Function in 6 weeks - progressing  - Patient will be independent with HEP in 6 weeks - progressing    Plan  Patient would benefit from: skilled PT  Planned modality interventions: cryotherapy  Planned therapy interventions: joint mobilization, manual therapy, neuromuscular re-education, patient education, strengthening, stretching, therapeutic activities, therapeutic exercise, home exercise program, functional ROM exercises, [place] taping and postural training  Frequency: 2x week (2-3x week)  Duration in weeks: 4  Treatment plan discussed with: patient, PTA and referring physician        Subjective Evaluation    History of Present Illness  Mechanism of injury: Pt notes a 90% improvement to date  She notes she still has pain when kneeling onto the R knee  The L knee she notes an improvement with sitting for longer periods but still does have some pain    She also notes if her knee does "flair up" she will notes increased swelling around her knee cap  L knee pain: 0/10 currently, 0/10 at best, and 4/10 at worse  Pain  Current pain ratin  At best pain ratin  At worst pain rating: 3  Location: R Knee          Objective     Tenderness   Left Knee   No tenderness in the lateral joint line, medial joint line and patellar tendon  Right Knee   Tenderness in the lateral joint line  No tenderness in the medial joint line  Neurological Testing     Sensation     Knee   Left Knee   Intact: light touch    Right Knee   Intact: light touch     Reflexes   Left   Patellar (L4): normal (2+)  Achilles (S1): normal (2+)    Right   Patellar (L4): normal (2+)  Achilles (S1): normal (2+)    Active Range of Motion   Left Knee   Normal active range of motion    Right Knee   Normal active range of motion    Patellar Static Positioning   Left Knee: baja  Right Knee: [facility]    Strength/Myotome Testing     Additional Strength Details  4+/5 strength noted at the R quad  5/5 noted at the B hip abd and ER and HS   5/5 noted throughout the rest of the BLE  Tests     Left Knee   Negative lateral Rachel and medial Rachel  Right Knee   Negative lateral Rachel and medial Rachel  Additional Tests Details  Shannan's was - on the bilateral   Wilda Samples test was + for decreased hip and quadriceps flexibility on the bilateral   Hamstring length was 0 degrees from 0 when placed in 90/90 position on the bilateral   Obers was - on the bilateral   Nobels was - on the bilateral       General Comments:      Knee Comments  LQS was negative  Gait was WNL        Flowsheet Rows      Most Recent Value   PT/OT G-Codes   Current Score  73   Projected Score  75   FOTO information reviewed  Yes              Diagnosis: B knee pain - L knee patella baja   Precautions: -   Manuals    Patella tendon S and tape CMF KLS CMF D/c CMF   Tibia ER mobs R     CMF   Re-eval    CMF    IASTM  -    CMF   ITB and quad S   CMF     Exercise Diary Bike 5 min  5 min  5 min   5 min level 4  5 min            Leg press  110# 2x15  110# 2x15 120# 2x15  110# 2x15   Lateral step-downs 8'' 2x15 8in 2x10  8'' 2x10 8in 2x10  8'' 2x15   lunges 20# 1x12 10x each  x10 each 10x each  20# 1x10   Wall sits :30x4 :30x4  :30x4 :30x4  :30x4           Fire hydrants 0# 2x15 0# 2x10 each  2# 2x10 0# 3x10 each 2# x15 each           Quad S B  :20x5       Star Excursion  x3 each x5 each  x5 each with cups 3x each  x3 each   Stool scoots 5# 20'x2 each 5# 20'x4 5# 20'x4 5# 20'x4                                                                        Modalities            CP PRN

## 2019-12-26 ENCOUNTER — OFFICE VISIT (OUTPATIENT)
Dept: PHYSICAL THERAPY | Facility: CLINIC | Age: 44
End: 2019-12-26
Payer: COMMERCIAL

## 2019-12-26 DIAGNOSIS — M22.2X1 PATELLOFEMORAL PAIN SYNDROME OF BOTH KNEES: Primary | ICD-10-CM

## 2019-12-26 DIAGNOSIS — M22.2X2 PATELLOFEMORAL PAIN SYNDROME OF BOTH KNEES: Primary | ICD-10-CM

## 2019-12-26 PROCEDURE — 97110 THERAPEUTIC EXERCISES: CPT

## 2019-12-26 PROCEDURE — 97112 NEUROMUSCULAR REEDUCATION: CPT

## 2019-12-26 NOTE — PROGRESS NOTES
Daily Note     Today's date: 2019  Patient name: Nenita Webb  : 1975  MRN: [id number]  Referring provider: Ronnell Moody MD  Dx:   Encounter Diagnosis     ICD-10-CM    1  Patellofemoral pain syndrome of both knees M22 2X1     M22 2X2                   Subjective: Patient states she has been compliant with stretches since last visit  She notes have stiffness and pressure in her knees when seated for any length of time  Objective: See treatment diary below      Assessment: Continued with outlined program  She was able to progress with walking lunges and maintain proper form  Patient cued for hip ER with lateral step downs  Plan: Progress treatment as tolerated  Diagnosis: B knee pain - L knee patella baja   Precautions: -   Manuals    Patella tendon S and tape CMF KLS CMF D/c    Tibia ER mobs R        Re-eval    CMF    IASTM  -       ITB and quad S   CMF     Exercise Diary        Bike 5 min  5 min  5 min   5 min level 4  5 min level4           Leg press  110# 2x15  110# 2x15 120# 2x15  130# 2x12   Lateral step-downs 8'' 2x15 8in 2x10  8'' 2x10 8in 2x10  8'' 2x15   lunges 20# 1x12 10x each  x10 each 10x each  20# 20'x4 walking    Wall sits :30x4 :30x4  :30x4 :30x4  :30x4           Fire hydrants 0# 2x15 0# 2x10 each  2# 2x10 0# 3x10 each 2# x15 each           Quad S B  :20x5       Star Excursion  x3 each x5 each  x5 each with cups 3x each  x3 each cups    Stool scoots 5# 20'x2 each 5# 20'x4 5# 20'x4 5# 20'x4 5# 20'x4                                                                       Modalities            CP PRN

## 2019-12-31 ENCOUNTER — APPOINTMENT (OUTPATIENT)
Dept: PHYSICAL THERAPY | Facility: CLINIC | Age: 44
End: 2019-12-31
Payer: COMMERCIAL

## 2020-01-06 ENCOUNTER — OFFICE VISIT (OUTPATIENT)
Dept: PHYSICAL THERAPY | Facility: CLINIC | Age: 45
End: 2020-01-06
Payer: COMMERCIAL

## 2020-01-06 DIAGNOSIS — M22.2X2 PATELLOFEMORAL PAIN SYNDROME OF BOTH KNEES: Primary | ICD-10-CM

## 2020-01-06 DIAGNOSIS — M22.2X1 PATELLOFEMORAL PAIN SYNDROME OF BOTH KNEES: Primary | ICD-10-CM

## 2020-01-06 PROCEDURE — 97110 THERAPEUTIC EXERCISES: CPT | Performed by: PHYSICAL THERAPIST

## 2020-01-06 PROCEDURE — 97112 NEUROMUSCULAR REEDUCATION: CPT | Performed by: PHYSICAL THERAPIST

## 2020-01-06 NOTE — PROGRESS NOTES
Daily Note     Today's date: 2020  Patient name: Jaye Negrete  : 1975  MRN: [id number]  Referring provider: Uday Baldwin MD  Dx:   Encounter Diagnosis     ICD-10-CM    1  Patellofemoral pain syndrome of both knees M22 2X1     M22 2X2                   Subjective: Patient reports she has been traveling a lot over the past couple of weeks  She reports her pain will increase with increased flexion when sitting  She also notes increased pain and swelling at the R knee with jogging on the TM but not the elliptical in which she performs outside of PT  Objective: See treatment diary below      Assessment: Continued with outlined program  Pt did have increased pain with bridge with HS curls at the L knee    Pt did have increased sxs with pressure applied to lateral patella on the L  Pt instructed on Shannan mandujano outside of PT  Plan: Progress treatment as tolerated  Diagnosis: B knee pain - L knee patella baja   Precautions: -   Manuals    Patella tendon S and tape CMF KLS CMF D/c    Tibia ER mobs R        Re-eval    CMF    IASTM  -       ITB and quad S   CMF     Exercise Diary        Bike 5 min  5 min  5 min   5 min level 4  5 min level4           Leg press  110# 2x15  110# 2x15 120# 2x15  130# 2x12   Lateral step-downs 8'' 2x15 8in 2x10  8'' 2x10 8in 2x10  8'' 2x15   lunges 20# 1x12 10x each  x10 each 10x each  20# 20'x4 walking    Wall sits :30x4 :30x4  :30x4 :30x4  :30x4           Fire hydrants 2 5# 2x15 0# 2x10 each  2# 2x10 0# 3x10 each 2# x15 each           Quad S B  :20x5       Star Excursion  x3 each x5 each  x5 each with cups 3x each  x3 each cups    Stool scoots 5# 20'x2 each 5# 20'x4 5# 20'x4 5# 20'x4 5# 20'x4   Bridge with HS curls 1x15, 1x8                                                                   Modalities            CP PRN

## 2020-01-24 ENCOUNTER — APPOINTMENT (OUTPATIENT)
Dept: PHYSICAL THERAPY | Facility: CLINIC | Age: 45
End: 2020-01-24
Payer: COMMERCIAL

## 2020-01-30 ENCOUNTER — EVALUATION (OUTPATIENT)
Dept: PHYSICAL THERAPY | Facility: CLINIC | Age: 45
End: 2020-01-30
Payer: COMMERCIAL

## 2020-01-30 DIAGNOSIS — M22.2X2 PATELLOFEMORAL PAIN SYNDROME OF BOTH KNEES: Primary | ICD-10-CM

## 2020-01-30 DIAGNOSIS — M22.2X1 PATELLOFEMORAL PAIN SYNDROME OF BOTH KNEES: Primary | ICD-10-CM

## 2020-01-30 PROCEDURE — 97110 THERAPEUTIC EXERCISES: CPT | Performed by: PHYSICAL THERAPIST

## 2020-01-30 PROCEDURE — 97140 MANUAL THERAPY 1/> REGIONS: CPT | Performed by: PHYSICAL THERAPIST

## 2020-01-30 NOTE — PROGRESS NOTES
PT Discharge    Today's date: 2020  Patient name: Charisse Ramirez  : 1975  MRN: [id number]  Referring provider: Kan Tucker MD  Dx:   Encounter Diagnosis     ICD-10-CM    1  Patellofemoral pain syndrome of both knees M22 2X1     M22 2X2                   Assessment  Assessment details: Patient is a 38 y o  female who presents with an improvement of the above listed impairments  Patient would continue benefit from her HEP  Thank you for the referral      Impairments: activity intolerance and pain with function  Understanding of Dx/Px/POC: good   Prognosis: good    Goals  Impairment Goals  - Decrease pain by 50% in 4 weeks  - met  - Increase bilateral flexibility by 50% in 4 weeks  - met  - Increase bilateral lower extremity strength globally to 5/5 in 4 weeks  - met     Functional Goals  - Return to Prior Level of Function in 6 weeks - met  - Patient will be independent with HEP in 6 weeks - met    Plan  Plan details: D/C from PT to HEP  Planned therapy interventions: home exercise program  Frequency: 2-3x week  Treatment plan discussed with: patient        Subjective Evaluation    History of Present Illness  Mechanism of injury: Pt notes a 95% improvement to date  She states her pain is present with higher impact activities such as running  She also notes some discomfort from sit to stand after sitting down for an extended periods of time  She states she is able to kneel without sxs noted  L knee pain: 0/10 currently, 0/10 at best, and 3/10 at worse  Pain  Current pain ratin  At best pain ratin  At worst pain ratin  Location: R Knee    Patient Goals  Patient goals for therapy: decreased pain          Objective     Tenderness   Left Knee   No tenderness in the lateral joint line, medial joint line and patellar tendon  Right Knee   No tenderness in the lateral joint line and medial joint line       Neurological Testing     Sensation     Knee   Left Knee   Intact: light touch    Right Knee   Intact: light touch     Reflexes   Left   Patellar (L4): normal (2+)  Achilles (S1): normal (2+)    Right   Patellar (L4): normal (2+)  Achilles (S1): normal (2+)    Active Range of Motion   Left Knee   Normal active range of motion    Right Knee   Normal active range of motion    Patellar Static Positioning   Left Knee: baja  Right Knee: [facility]    Strength/Myotome Testing     Additional Strength Details  5/5 strength noted at the R quad  5/5 noted at the B hip abd and ER and HS   5/5 noted throughout the rest of the BLE  Tests     Left Knee   Negative lateral Rachel and medial Rachel  Right Knee   Negative lateral Rachel and medial Rachel  Additional Tests Details  Shannan's was - on the bilateral   Franklyn Lather test was - for decreased hip and quadriceps flexibility on the bilateral   Hamstring length was 0 degrees from 0 when placed in 90/90 position on the bilateral   Obers was - on the bilateral   Nobels was - on the bilateral       General Comments:      Knee Comments  LQS was negative  Gait was WNL  Pt able to run with a normalized gait pattern  Discussed with pt to watch IR at the hip with running and to drive knees forward to avoid pain with higher impact activities  Flowsheet Rows      Most Recent Value   PT/OT G-Codes   Current Score  72   Projected Score  75            Re-eval and review of HEP with good understanding

## 2021-03-05 ENCOUNTER — OFFICE VISIT (OUTPATIENT)
Dept: FAMILY MEDICINE CLINIC | Facility: CLINIC | Age: 46
End: 2021-03-05
Payer: COMMERCIAL

## 2021-03-05 ENCOUNTER — APPOINTMENT (OUTPATIENT)
Dept: RADIOLOGY | Facility: CLINIC | Age: 46
End: 2021-03-05
Payer: COMMERCIAL

## 2021-03-05 VITALS
RESPIRATION RATE: 16 BRPM | WEIGHT: 167 LBS | SYSTOLIC BLOOD PRESSURE: 118 MMHG | BODY MASS INDEX: 25.31 KG/M2 | OXYGEN SATURATION: 98 % | DIASTOLIC BLOOD PRESSURE: 80 MMHG | HEIGHT: 68 IN | HEART RATE: 83 BPM

## 2021-03-05 DIAGNOSIS — Z00.00 WELL ADULT EXAM: ICD-10-CM

## 2021-03-05 DIAGNOSIS — G47.34 NOCTURNAL HYPOXEMIA: ICD-10-CM

## 2021-03-05 DIAGNOSIS — Z12.31 ENCOUNTER FOR SCREENING MAMMOGRAM FOR BREAST CANCER: ICD-10-CM

## 2021-03-05 DIAGNOSIS — J34.2 DEVIATED NASAL SEPTUM: ICD-10-CM

## 2021-03-05 DIAGNOSIS — R05.3 CHRONIC COUGH: ICD-10-CM

## 2021-03-05 DIAGNOSIS — M54.2 NECK PAIN: Primary | ICD-10-CM

## 2021-03-05 DIAGNOSIS — M54.2 NECK PAIN: ICD-10-CM

## 2021-03-05 DIAGNOSIS — L20.9 ATOPIC DERMATITIS, UNSPECIFIED TYPE: ICD-10-CM

## 2021-03-05 DIAGNOSIS — F07.81 POSTCONCUSSION SYNDROME: ICD-10-CM

## 2021-03-05 DIAGNOSIS — M50.30 DDD (DEGENERATIVE DISC DISEASE), CERVICAL: ICD-10-CM

## 2021-03-05 PROCEDURE — 72050 X-RAY EXAM NECK SPINE 4/5VWS: CPT

## 2021-03-05 PROCEDURE — 3008F BODY MASS INDEX DOCD: CPT | Performed by: FAMILY MEDICINE

## 2021-03-05 PROCEDURE — 99396 PREV VISIT EST AGE 40-64: CPT | Performed by: FAMILY MEDICINE

## 2021-03-05 PROCEDURE — 1036F TOBACCO NON-USER: CPT | Performed by: FAMILY MEDICINE

## 2021-03-05 PROCEDURE — 3725F SCREEN DEPRESSION PERFORMED: CPT | Performed by: FAMILY MEDICINE

## 2021-03-05 RX ORDER — CRISABOROLE 20 MG/G
OINTMENT TOPICAL 2 TIMES DAILY
Qty: 100 G | Refills: 1 | Status: SHIPPED | OUTPATIENT
Start: 2021-03-05 | End: 2021-04-26

## 2021-03-05 RX ORDER — FLUOCINONIDE 0.5 MG/G
OINTMENT TOPICAL 2 TIMES DAILY
Qty: 60 G | Refills: 1 | Status: SHIPPED | OUTPATIENT
Start: 2021-03-05 | End: 2022-01-28 | Stop reason: ALTCHOICE

## 2021-03-05 NOTE — PROGRESS NOTES
Assessment/Plan:    Hx of neck pain but with some atypical sx since its right over the spine and causes ear pain   Lets try a new topical for her dermatitis   Get a coupon for the eucrisa if needed   If not at least use the tcs   Also showed me her night time oxygen data from her phone   She needs to see sleep med for eval   Also ask gyn about the lower abdominal bloating       1  Neck pain  -     XR spine cervical complete 4 or 5 vw non injury; Future; Expected date: 03/05/2021    2  Well adult exam  -     Ambulatory referral to Gynecology; Future    3  Encounter for screening mammogram for breast cancer  -     Mammo screening bilateral w 3d & cad; Future; Expected date: 03/05/2021    4  BMI 25 0-25 9,adult    5  DDD (degenerative disc disease), cervical    6  Postconcussion syndrome    7  Chronic cough    8  Atopic dermatitis, unspecified type  -     fluocinonide (LIDEX) 0 05 % ointment; Apply topically 2 (two) times a day  -     Crisaborole (Eucrisa) 2 % OINT; Apply topically 2 (two) times a day    9  Nocturnal hypoxemia  -     Ambulatory referral to Sleep Medicine; Future    10  Deviated nasal septum       nasacort      Subjective:      Patient ID: Jamie Bush is a 54 y o  female  HPI    here for yearly     Neck pain over the spine that causes left sided ear pain     Bloating 1 in below the umbilical   No uti issues   tenenesmus feeling but goes 1 time a day   More bm during the weekends   Drinks  2-3 cups a day of coffee (brewed coffee)   Hx of knee pain but stable now   Chronic cough - tried many different inhaleris and allergy meds      The following portions of the patient's history were reviewed and updated as appropriate: allergies, current medications, past family history, past medical history, past social history, past surgical history and problem list     Review of Systems   Constitutional: Negative for fever and unexpected weight change     HENT: Negative for nosebleeds and trouble swallowing  Eyes: Negative for visual disturbance  Respiratory: Negative for chest tightness and shortness of breath  Cardiovascular: Negative for chest pain, palpitations and leg swelling  Gastrointestinal: Negative for abdominal pain, constipation, diarrhea and nausea  Bloating lower abdomen with unconfortabale feeling    Endocrine: Negative for cold intolerance  Genitourinary: Negative for dysuria and urgency  Musculoskeletal: Negative for joint swelling and myalgias  Skin: Negative for rash  Neurological: Negative for tremors, seizures and syncope  Hematological: Does not bruise/bleed easily  Psychiatric/Behavioral: Negative for hallucinations and suicidal ideas  Objective:      /80 (BP Location: Left arm, Patient Position: Sitting, Cuff Size: Standard)   Pulse 83   Resp 16   Ht 5' 7 75" (1 721 m)   Wt 75 8 kg (167 lb)   SpO2 98%   BMI 25 58 kg/m²     No visits with results within 2 Week(s) from this visit  Latest known visit with results is:   No results found for any previous visit  Physical Exam  Vitals signs and nursing note reviewed  Constitutional:       Appearance: She is well-developed  HENT:      Head: Normocephalic and atraumatic  Neck:      Musculoskeletal: Normal range of motion and neck supple  Cardiovascular:      Rate and Rhythm: Normal rate and regular rhythm  Heart sounds: Normal heart sounds  No murmur  Pulmonary:      Effort: Pulmonary effort is normal       Breath sounds: Normal breath sounds  No wheezing or rales  Abdominal:      General: Bowel sounds are normal  There is no distension  Palpations: Abdomen is soft  Tenderness: There is no abdominal tenderness  Musculoskeletal: Normal range of motion  General: No tenderness  Lymphadenopathy:      Cervical: No cervical adenopathy  Skin:     General: Skin is warm and dry  Capillary Refill: Capillary refill takes less than 2 seconds  Findings: No rash  Neurological:      Mental Status: She is alert and oriented to person, place, and time  Cranial Nerves: No cranial nerve deficit  Sensory: No sensory deficit  Motor: No abnormal muscle tone  Psychiatric:         Behavior: Behavior normal          Thought Content: Thought content normal          Judgment: Judgment normal              BMI Counseling: Body mass index is 25 58 kg/m²  The BMI is above normal  Nutrition recommendations include decreasing portion sizes, encouraging healthy choices of fruits and vegetables and limiting drinks that contain sugar  Exercise recommendations include moderate physical activity 150 minutes/week  No pharmacotherapy was ordered                 Berenice Vallejo MD  [place] [id number]

## 2021-03-10 ENCOUNTER — TRANSCRIBE ORDERS (OUTPATIENT)
Dept: LAB | Facility: CLINIC | Age: 46
End: 2021-03-10

## 2021-03-10 ENCOUNTER — APPOINTMENT (OUTPATIENT)
Dept: LAB | Facility: CLINIC | Age: 46
End: 2021-03-10
Payer: COMMERCIAL

## 2021-03-10 DIAGNOSIS — Z00.00 WELL ADULT EXAM: ICD-10-CM

## 2021-03-10 LAB
ALBUMIN SERPL BCP-MCNC: 3.8 G/DL (ref 3.5–5)
ALP SERPL-CCNC: 58 U/L (ref 46–116)
ALT SERPL W P-5'-P-CCNC: 22 U/L (ref 12–78)
ANION GAP SERPL CALCULATED.3IONS-SCNC: 3 MMOL/L (ref 4–13)
AST SERPL W P-5'-P-CCNC: 10 U/L (ref 5–45)
BASOPHILS # BLD AUTO: 0.04 THOUSANDS/ΜL (ref 0–0.1)
BASOPHILS NFR BLD AUTO: 1 % (ref 0–1)
BILIRUB SERPL-MCNC: 0.67 MG/DL (ref 0.2–1)
BUN SERPL-MCNC: 8 MG/DL (ref 5–25)
CALCIUM SERPL-MCNC: 8.9 MG/DL (ref 8.3–10.1)
CHLORIDE SERPL-SCNC: 110 MMOL/L (ref 100–108)
CHOLEST SERPL-MCNC: 204 MG/DL (ref 50–200)
CO2 SERPL-SCNC: 26 MMOL/L (ref 21–32)
CREAT SERPL-MCNC: 0.79 MG/DL (ref 0.6–1.3)
EOSINOPHIL # BLD AUTO: 0.19 THOUSAND/ΜL (ref 0–0.61)
EOSINOPHIL NFR BLD AUTO: 3 % (ref 0–6)
ERYTHROCYTE [DISTWIDTH] IN BLOOD BY AUTOMATED COUNT: 11 % (ref 11.6–15.1)
GFR SERPL CREATININE-BSD FRML MDRD: 89 ML/MIN/1.73SQ M
GLUCOSE P FAST SERPL-MCNC: 90 MG/DL (ref 65–99)
HCT VFR BLD AUTO: 41.5 % (ref 34.8–46.1)
HDLC SERPL-MCNC: 50 MG/DL
HGB BLD-MCNC: 14 G/DL (ref 11.5–15.4)
IMM GRANULOCYTES # BLD AUTO: 0.03 THOUSAND/UL (ref 0–0.2)
IMM GRANULOCYTES NFR BLD AUTO: 1 % (ref 0–2)
LDLC SERPL CALC-MCNC: 135 MG/DL (ref 0–100)
LYMPHOCYTES # BLD AUTO: 1.27 THOUSANDS/ΜL (ref 0.6–4.47)
LYMPHOCYTES NFR BLD AUTO: 23 % (ref 14–44)
MCH RBC QN AUTO: 32.5 PG (ref 26.8–34.3)
MCHC RBC AUTO-ENTMCNC: 33.7 G/DL (ref 31.4–37.4)
MCV RBC AUTO: 96 FL (ref 82–98)
MONOCYTES # BLD AUTO: 0.62 THOUSAND/ΜL (ref 0.17–1.22)
MONOCYTES NFR BLD AUTO: 11 % (ref 4–12)
NEUTROPHILS # BLD AUTO: 3.43 THOUSANDS/ΜL (ref 1.85–7.62)
NEUTS SEG NFR BLD AUTO: 61 % (ref 43–75)
NONHDLC SERPL-MCNC: 154 MG/DL
NRBC BLD AUTO-RTO: 0 /100 WBCS
PLATELET # BLD AUTO: 337 THOUSANDS/UL (ref 149–390)
PMV BLD AUTO: 10.3 FL (ref 8.9–12.7)
POTASSIUM SERPL-SCNC: 4 MMOL/L (ref 3.5–5.3)
PROT SERPL-MCNC: 7.1 G/DL (ref 6.4–8.2)
RBC # BLD AUTO: 4.31 MILLION/UL (ref 3.81–5.12)
SODIUM SERPL-SCNC: 139 MMOL/L (ref 136–145)
TRIGL SERPL-MCNC: 97 MG/DL
WBC # BLD AUTO: 5.58 THOUSAND/UL (ref 4.31–10.16)

## 2021-03-10 PROCEDURE — 80053 COMPREHEN METABOLIC PANEL: CPT

## 2021-03-10 PROCEDURE — 36415 COLL VENOUS BLD VENIPUNCTURE: CPT

## 2021-03-10 PROCEDURE — 80061 LIPID PANEL: CPT

## 2021-03-10 PROCEDURE — 85025 COMPLETE CBC W/AUTO DIFF WBC: CPT

## 2021-03-12 ENCOUNTER — TELEPHONE (OUTPATIENT)
Dept: FAMILY MEDICINE CLINIC | Facility: CLINIC | Age: 46
End: 2021-03-12

## 2021-03-12 NOTE — TELEPHONE ENCOUNTER
----- Message from Venkat Sevilla sent at 3/11/2021  8:00 PM EST -----  Regarding: Test Results Question  Contact: [phone]  Good Evening,    I'm following up on the X-ray of my neck taken a week ago to see if the results are in       Thank you,  Russell Newell

## 2021-04-08 ENCOUNTER — OFFICE VISIT (OUTPATIENT)
Dept: SLEEP CENTER | Facility: CLINIC | Age: 46
End: 2021-04-08
Payer: COMMERCIAL

## 2021-04-08 ENCOUNTER — HOSPITAL ENCOUNTER (OUTPATIENT)
Dept: SLEEP CENTER | Facility: CLINIC | Age: 46
Discharge: HOME | End: 2021-04-08
Payer: COMMERCIAL

## 2021-04-08 VITALS
WEIGHT: 167 LBS | DIASTOLIC BLOOD PRESSURE: 82 MMHG | BODY MASS INDEX: 26.21 KG/M2 | HEART RATE: 72 BPM | HEIGHT: 67 IN | SYSTOLIC BLOOD PRESSURE: 112 MMHG

## 2021-04-08 DIAGNOSIS — G47.33 OSA (OBSTRUCTIVE SLEEP APNEA): Primary | ICD-10-CM

## 2021-04-08 DIAGNOSIS — G47.34 NOCTURNAL HYPOXEMIA: ICD-10-CM

## 2021-04-08 DIAGNOSIS — G47.33 OSA (OBSTRUCTIVE SLEEP APNEA): ICD-10-CM

## 2021-04-08 PROCEDURE — G0399 HOME SLEEP TEST/TYPE 3 PORTA: HCPCS

## 2021-04-08 PROCEDURE — 1036F TOBACCO NON-USER: CPT | Performed by: INTERNAL MEDICINE

## 2021-04-08 PROCEDURE — 3008F BODY MASS INDEX DOCD: CPT | Performed by: INTERNAL MEDICINE

## 2021-04-08 PROCEDURE — 99203 OFFICE O/P NEW LOW 30 MIN: CPT | Performed by: INTERNAL MEDICINE

## 2021-04-08 NOTE — PROGRESS NOTES
Consultation - Sleep Center   Colby Jensen : 1975  MRN: [id number]      Assessment:  The patient's pulse oximetry tracing is consistent with obstructive sleep apnea  Furthermore, despite the absence of snoring, according to her , she does experience daytime sleepiness and difficulty focusing at work  Plan:  I have scheduled the patient for home sleep apnea testing  Follow up: We will meet after testing to discuss results  In addition, the patient has respiratory complaints which have been addressed in the past, but which may require re-evaluation  She has a history of chronic but intermittent cough  History of Present Illness:   51 y  o female who was reviewing information from her Fitbit device and found that her average oxygen saturation on a number of nights was 90%  Furthermore the pattern showed a sawtooth appearance, which is consistent with obstructive sleep apnea  Her  does not notice snoring or apneas, although he is a heavy sleeper  Patient has a history of deviated septum and has used Nasacort in the past   She also has a history of what sounds like bronchitis, triggered by cold air        Review of Systems      Genitourinary none   Cardiology none   Gastrointestinal none   Neurology forgetfulness, poor concentration or confusion,  and difficulty with memory   Constitutional fatigue and excessive sweating at night   Integumentary none   Psychiatry none   Musculoskeletal joint pain   Pulmonary shortness of breath with activity   ENT none   Endocrine excessive thirst   Hematological none         I have reviewed and updated the review of systems as necessary    Historical Information    Past Medical History:  Chronic cough, shortness of breath, negative for cardiovascular disease    Family History: non-contributory    Social History     Socioeconomic History    Marital status: /Civil Union     Spouse name: None    Number of children: None    Years of education: 4 year college    Highest education level: None   Occupational History    Occupation: Retail   Social Needs    Financial resource strain: None    Food insecurity     Worry: None     Inability: None    Transportation needs     Medical: None     Non-medical: None   Tobacco Use    Smoking status: Never Smoker    Smokeless tobacco: Never Used   Substance and Sexual Activity    Alcohol use:  Yes    Drug use: None     Comment: Denies    Sexual activity: None   Lifestyle    Physical activity     Days per week: None     Minutes per session: None    Stress: None   Relationships    Social connections     Talks on phone: None     Gets together: None     Attends Latter day service: None     Active member of club or organization: None     Attends meetings of clubs or organizations: None     Relationship status: None    Intimate partner violence     Fear of current or ex partner: None     Emotionally abused: None     Physically abused: None     Forced sexual activity: None   Other Topics Concern    None   Social History Narrative    Most recent tobacco use screenin2020    Do you currently or have you served in the [employer]: No    Were you activated, into active duty, as a member of the [email or web address] or as a Reservist: No    Occupation: corporate retail    Education: [address]    Marital status:     Sexual orientation: Heterosexual    Exercise level: Occasional    Diet: Regular    General stress level: Medium    Alcohol intake: None    Caffeine intake: Occasional    Chewing tobacco: none    Illicit drugs: Denied    Guns present in home: No    Seat belts used routinely: Yes    Sunscreen used routinely: Yes    Smoke alarm in home: Yes    Advance directive: Yes    Salt Intake: No added salt         Sleep Schedule: unremarkable    Snoring:  No    Witnessed Apnea:  No    Medications/Allergies:    Current Outpatient Medications:     Crisaborole (Eucrisa) 2 % OINT, Apply topically 2 (two) times a day, Disp: 100 g, Rfl: 1    fluocinonide (LIDEX) 0 05 % ointment, Apply topically 2 (two) times a day, Disp: 60 g, Rfl: 1    Multiple Vitamin (MULTIVITAMIN) capsule, Take by mouth, Disp: , Rfl:     Triamcinolone Acetonide (NASACORT ALLERGY 24HR) 55 MCG/ACT AERO, into each nostril, Disp: , Rfl:     cetirizine (ZyrTEC) 10 MG chewable tablet, Chew 10 mg daily, Disp: , Rfl:     Influenza Vac Subunit Quad (FLUCELVAX QUADRIVALENT IM), inject 0 5 milliliter intramuscularly, Disp: , Rfl: 0        No notes on file                  Objective:    Vital Signs:   Vitals:    04/08/21 0800   BP: 112/82   Pulse: 72   Weight: 75 8 kg (167 lb)   Height: 5' 7" (1 702 m)     Neck Circumference: 17      [place] Sleepiness Scale: Total score: 10    Physical Exam:    General: Alert, appropriate, cooperative, normal build    Head: NC/AT, mild retrognathia    Nose:  Observable septal deviation, nares partially obstructed, mucosa normal    Throat: Airway diminished, tongue base thickened, no tonsils visualized, but the tonsillar crypts are large    Neck: Normal, no thyromegaly or lymphadenopathy, no JVD    Heart: RR, normal S1 and S2, no murmurs    Chest: Clear bilaterally    Extremity: No clubbing, cyanosis, no edema    Skin: Warm, dry    Neuro: No motor abnormalities, cranial nerves appear intact      Counseling / Coordination of Care  A description of the counseling / coordination of care: We discussed the pathophysiology of obstructive sleep apnea as well as the possible treatment options  We also discussed the rationale for positive airway pressure therapy  Board Certified Sleep Specialist    Portions of the record may have been created with voice recognition software  Occasional wrong word or "sound a like" substitutions may have occurred due to the inherent limitations of voice recognition software  Read the chart carefully and recognize, using context, where substitutions have occurred

## 2021-04-08 NOTE — PROGRESS NOTES
Home Sleep Study Documentation    Pre-Sleep Home Study:    Set-up and instructions performed by: PAMELA Montes, KOURTNEY    Technician performed demonstration for Patient: yes    Return demonstration performed by Patient: yes    Written instructions provided to Patient: yes    Patient signed consent form: yes        Post-Sleep Home Study:    Additional comments by Patient:       Home Sleep Study Failed:no:    Failure reason: N/A    Reported or Detected: N/A    Scored by: CHARLOTTE Johns RPSGT

## 2021-04-15 ENCOUNTER — TELEPHONE (OUTPATIENT)
Dept: SLEEP CENTER | Facility: CLINIC | Age: 46
End: 2021-04-15

## 2021-04-15 NOTE — TELEPHONE ENCOUNTER
----- Message from Edith Singer MD sent at 4/11/2021  4:50 PM EDT -----      ----- Message -----  From: Stefan Amos MD  Sent: 4/9/2021   3:55 PM EDT  To: Edith Singer MD, Quentin Romero MD

## 2021-04-26 ENCOUNTER — OFFICE VISIT (OUTPATIENT)
Dept: OBGYN CLINIC | Facility: CLINIC | Age: 46
End: 2021-04-26
Payer: COMMERCIAL

## 2021-04-26 VITALS — SYSTOLIC BLOOD PRESSURE: 114 MMHG | WEIGHT: 168 LBS | DIASTOLIC BLOOD PRESSURE: 66 MMHG | BODY MASS INDEX: 26.31 KG/M2

## 2021-04-26 DIAGNOSIS — Z12.11 COLON CANCER SCREENING: ICD-10-CM

## 2021-04-26 DIAGNOSIS — Z11.3 ROUTINE SCREENING FOR STI (SEXUALLY TRANSMITTED INFECTION): ICD-10-CM

## 2021-04-26 DIAGNOSIS — Z01.419 WELL WOMAN EXAM WITH ROUTINE GYNECOLOGICAL EXAM: Primary | ICD-10-CM

## 2021-04-26 PROBLEM — Z13.71 BRCA NEGATIVE: Status: ACTIVE | Noted: 2021-04-26

## 2021-04-26 PROBLEM — G44.309 POST-TRAUMATIC HEADACHE: Status: ACTIVE | Noted: 2018-03-26

## 2021-04-26 PROBLEM — H53.149 VISUAL DISCOMFORT: Status: ACTIVE | Noted: 2018-03-26

## 2021-04-26 PROBLEM — S06.9X9S LATE EFFECT OF INTRACRANIAL INJURY (HCC): Status: ACTIVE | Noted: 2018-03-26

## 2021-04-26 PROBLEM — R42 DISEQUILIBRIUM: Status: ACTIVE | Noted: 2018-03-26

## 2021-04-26 PROCEDURE — G0145 SCR C/V CYTO,THINLAYER,RESCR: HCPCS | Performed by: OBSTETRICS & GYNECOLOGY

## 2021-04-26 PROCEDURE — 87624 HPV HI-RISK TYP POOLED RSLT: CPT | Performed by: OBSTETRICS & GYNECOLOGY

## 2021-04-26 PROCEDURE — 87591 N.GONORRHOEAE DNA AMP PROB: CPT | Performed by: OBSTETRICS & GYNECOLOGY

## 2021-04-26 PROCEDURE — S0610 ANNUAL GYNECOLOGICAL EXAMINA: HCPCS | Performed by: OBSTETRICS & GYNECOLOGY

## 2021-04-26 PROCEDURE — 87491 CHLMYD TRACH DNA AMP PROBE: CPT | Performed by: OBSTETRICS & GYNECOLOGY

## 2021-04-26 RX ORDER — FEXOFENADINE HCL 180 MG/1
180 TABLET ORAL DAILY
COMMUNITY
End: 2022-01-28 | Stop reason: ALTCHOICE

## 2021-04-26 NOTE — PROGRESS NOTES
ASSESSMENT & PLAN: Jaiden Matson is a 51 y o   with normal gynecologic exam     1   Routine well woman exam done today  2    Pap and HPV:Pap with HPV was done today  Current ASCCP Guidelines reviewed  3  3D Mammogram ordered  Recommend yearly mammography  4   The patient accepted STD testing  chlamydia, gonorrhea, HIV and Rpr, hep b&c testing performed  Safe sex practices have been discussed  5  The patient is sexually active  She declined contraception and options have been discussed  Her partner has had a vasectomy  6  The following were reviewed in today's visit: breast self exam, STD testing, use and side effects of OCPs, use and side effects of HRT, menopause, exercise and healthy diet  She has been having nightly hot flashes  We discussed treatment with a low-dose birth control pill or antidepressant  Patient declines  7  Patient to return to office in 12 months for  annual      All questions have been answered to her satisfaction  CC:  Annual Gynecologic Examination    HPI: Jaiden Matson is a 51 y o  Samantha Gawilver who presents for annual gynecologic examination  She has the following concerns:   Hot flashes    Health Maintenance:    She exercises 4 days per week  She wears her seatbelt routinely  She does perform regular monthly self breast exams  She feels safe at home  Patients does follow a  regular diet  History reviewed  No pertinent past medical history  Past Surgical History:   Procedure Laterality Date    MAMMO (HISTORICAL)  2019    [facility]    TOOTH EXTRACTION         Past OB/Gyn History:  Period Duration (Days): 3  Period Pattern: Regular  Menstrual Flow: Light  Dysmenorrhea: (!) Mild  Dysmenorrhea Symptoms: CrampingPatient's last menstrual period was 2021 (exact date)    Menstrual History:  OB History        1    Para        Term                AB   1    Living           SAB   1    TAB        Ectopic        Multiple        Live Births                      History of sexually transmitted infection No  Patient is currently sexually active  heterosexual Birth control:  vasectomy      Family History   Problem Relation Age of Onset    Breast cancer Mother     Other Father         acute intermittent porphyria    Cancer Father     Heart attack Father     Leukemia Paternal Grandfather     Ovarian cancer Paternal Aunt         disseminated    Leukemia Paternal Uncle     Cancer Sister        Social History:  Social History     Socioeconomic History    Marital status: /Civil Union     Spouse name: Not on file    Number of children: Not on file    Years of education: 4 year college    Highest education level: Not on file   Occupational History    Occupation: Retail   Social Needs    Financial resource strain: Not on file    Food insecurity     Worry: Not on file     Inability: Not on file   [employer] needs     Medical: Not on file     Non-medical: Not on file   Tobacco Use    Smoking status: Never Smoker    Smokeless tobacco: Never Used   Substance and Sexual Activity    Alcohol use:  Yes     Alcohol/week: 2 0 standard drinks     Types: 1 Glasses of wine, 1 Shots of liquor per week    Drug use: Never     Comment: Denies    Sexual activity: Yes     Partners: Male     Birth control/protection: Male Sterilization   Lifestyle    Physical activity     Days per week: Not on file     Minutes per session: Not on file    Stress: Not on file   Relationships    Social connections     Talks on phone: Not on file     Gets together: Not on file     Attends Lutheran service: Not on file     Active member of club or organization: Not on file     Attends meetings of clubs or organizations: Not on file     Relationship status: Not on file    Intimate partner violence     Fear of current or ex partner: Not on file     Emotionally abused: Not on file     Physically abused: Not on file     Forced sexual activity: Not on file   Other Topics Concern    Not on file   Social History Narrative    Most recent tobacco use screenin2020    Do you currently or have you served in the [employer]: No    Were you activated, into active duty, as a member of the [employer] or as a Reservist: No    Occupation: [employer] retail    Education: [address]     Marital status:     Sexual orientation: Heterosexual    Exercise level: Occasional    Diet: Regular    General stress level: Medium    Alcohol intake: None    Caffeine intake: Occasional    Chewing tobacco: none    Illicit drugs: Denied    Guns present in home: No    Seat belts used routinely: Yes    Sunscreen used routinely: Yes    Smoke alarm in home: Yes    Advance directive: Yes    Salt Intake: No added salt     Presently lives with  Her   Patient is   Patient is currently employed   Allergies   Allergen Reactions    Keflex [Cephalexin]     Other Other (See Comments)     Allergic to lotions and cream bases- gold sodium thiosulfate, fragrance mix, coconut mix,coconut diethanolamide, ethylenediamine diahydrchloride, kathon CG, Phenylenediamine, Cats    Aspirin Rash       Current Outpatient Medications:     fexofenadine (ALLEGRA) 180 MG tablet, Take 180 mg by mouth daily, Disp: , Rfl:     fluocinonide (LIDEX) 0 05 % ointment, Apply topically 2 (two) times a day, Disp: 60 g, Rfl: 1    Multiple Vitamin (MULTIVITAMIN) capsule, Take by mouth, Disp: , Rfl:     Triamcinolone Acetonide (NASACORT ALLERGY 24HR) 55 MCG/ACT AERO, into each nostril, Disp: , Rfl:     cetirizine (ZyrTEC) 10 MG chewable tablet, Chew 10 mg daily, Disp: , Rfl:     Review of Systems:  Review of Systems   Constitutional: Negative  HENT: Negative  Respiratory: Negative  Cardiovascular: Negative  Gastrointestinal: Negative  Genitourinary: Negative  Neurological: Negative  Psychiatric/Behavioral: Negative          Physical Exam:  /66   Wt 76 2 kg (168 lb)   LMP 04/01/2021 (Exact Date)   Breastfeeding No   BMI 26 31 kg/m²    Physical Exam  Constitutional:       Appearance: Normal appearance  She is normal weight  Genitourinary:      Vulva, urethra, bladder, vagina, cervix, uterus, right adnexa and left adnexa normal       No vulval tenderness or Bartholin's cyst noted  No signs of labial injury  No signs of injury in the vagina  Vaginal rugosity present  No vaginal discharge, tenderness or bleeding  Cervix is nulliparous  Cervical pinkness present  No cervical polyp  Uterus is mobile  Uterus is not enlarged or tender  HENT:      Head: Normocephalic and atraumatic  Eyes:      Extraocular Movements: Extraocular movements intact  Conjunctiva/sclera: Conjunctivae normal       Pupils: Pupils are equal, round, and reactive to light  Cardiovascular:      Rate and Rhythm: Normal rate and regular rhythm  Heart sounds: Normal heart sounds  No murmur  Pulmonary:      Effort: Pulmonary effort is normal  No respiratory distress  Breath sounds: Normal breath sounds  No wheezing or rales  Chest:      Breasts:         Right: Normal  No swelling, bleeding, inverted nipple, mass, nipple discharge, skin change or tenderness  Left: Normal  No swelling, bleeding, inverted nipple, mass, nipple discharge, skin change or tenderness  Abdominal:      General: Abdomen is flat  There is no distension  Palpations: Abdomen is soft  Tenderness: There is no abdominal tenderness  There is no guarding  Neurological:      General: No focal deficit present  Mental Status: She is alert and oriented to person, place, and time  Skin:     General: Skin is warm and dry  Psychiatric:         Mood and Affect: Mood normal          Behavior: Behavior normal    Vitals signs and nursing note reviewed

## 2021-04-27 ENCOUNTER — TRANSCRIBE ORDERS (OUTPATIENT)
Dept: LAB | Facility: CLINIC | Age: 46
End: 2021-04-27

## 2021-04-27 ENCOUNTER — APPOINTMENT (OUTPATIENT)
Dept: LAB | Facility: CLINIC | Age: 46
End: 2021-04-27
Payer: COMMERCIAL

## 2021-04-27 DIAGNOSIS — Z11.3 ROUTINE SCREENING FOR STI (SEXUALLY TRANSMITTED INFECTION): ICD-10-CM

## 2021-04-27 LAB — RPR SER QL: NORMAL

## 2021-04-27 PROCEDURE — 86803 HEPATITIS C AB TEST: CPT

## 2021-04-27 PROCEDURE — 87389 HIV-1 AG W/HIV-1&-2 AB AG IA: CPT

## 2021-04-27 PROCEDURE — 87340 HEPATITIS B SURFACE AG IA: CPT

## 2021-04-27 PROCEDURE — 36415 COLL VENOUS BLD VENIPUNCTURE: CPT

## 2021-04-27 PROCEDURE — 86592 SYPHILIS TEST NON-TREP QUAL: CPT

## 2021-04-28 LAB
HBV SURFACE AG SER QL: NORMAL
HCV AB SER QL: NORMAL
HIV 1+2 AB+HIV1 P24 AG SERPL QL IA: NORMAL
HPV HR 12 DNA CVX QL NAA+PROBE: NEGATIVE
HPV16 DNA CVX QL NAA+PROBE: NEGATIVE
HPV18 DNA CVX QL NAA+PROBE: NEGATIVE

## 2021-04-30 ENCOUNTER — TELEPHONE (OUTPATIENT)
Dept: GASTROENTEROLOGY | Facility: AMBULARY SURGERY CENTER | Age: 46
End: 2021-04-30

## 2021-04-30 LAB
C TRACH DNA SPEC QL NAA+PROBE: NEGATIVE
N GONORRHOEA DNA SPEC QL NAA+PROBE: NEGATIVE

## 2021-04-30 NOTE — TELEPHONE ENCOUNTER
Pt passed OA and would like a call back to schedule her colon, no provider preference     #  [phone]

## 2021-05-03 LAB
LAB AP GYN PRIMARY INTERPRETATION: NORMAL
Lab: NORMAL

## 2021-05-04 ENCOUNTER — OFFICE VISIT (OUTPATIENT)
Dept: SLEEP CENTER | Facility: CLINIC | Age: 46
End: 2021-05-04
Payer: COMMERCIAL

## 2021-05-04 VITALS
HEIGHT: 67 IN | BODY MASS INDEX: 26.27 KG/M2 | SYSTOLIC BLOOD PRESSURE: 110 MMHG | WEIGHT: 167.4 LBS | DIASTOLIC BLOOD PRESSURE: 76 MMHG

## 2021-05-04 DIAGNOSIS — G47.33 OSA (OBSTRUCTIVE SLEEP APNEA): Primary | ICD-10-CM

## 2021-05-04 PROCEDURE — 99214 OFFICE O/P EST MOD 30 MIN: CPT | Performed by: INTERNAL MEDICINE

## 2021-05-04 PROCEDURE — 1036F TOBACCO NON-USER: CPT | Performed by: INTERNAL MEDICINE

## 2021-05-04 PROCEDURE — 3008F BODY MASS INDEX DOCD: CPT | Performed by: INTERNAL MEDICINE

## 2021-05-04 NOTE — PROGRESS NOTES
Progress Note - Sleep Center   Bharat Muro :1975 MRN: [id number]      Reason for Visit:    51 y  o female with severe sleep fragmentation and excessive daytime sleepiness  Home sleep apnea testing showed very mild YULIET  Home sleep testing is notorious for under estimating the severity of YULIET and may have missed the diagnosis in this case  Furthermore additional information about sleep architecture and the presence of periodic limb movements would be extremely helpful  Assessment:  May still have obstructive sleep apnea  Needs in-lab polysomnography  Try delta wave CD to reduce insomnia    Plan:  Diagnostic polysomnography    Listen to delta wave CD at night to reduce insomnia    The patient requested names for ENT consultation    Follow up: After testing    History of Present Illness:  Severe sleep maintenance insomnia with excessive daytime sleepiness  Review of Systems      Genitourinary none   Cardiology none   Gastrointestinal none   Neurology none   Constitutional none   Integumentary none   Psychiatry none   Musculoskeletal none   Pulmonary none   ENT none   Endocrine none   Hematological none           I have reviewed and updated the review of systems as necessary     Historical Information    No past medical history on file        Past Surgical History:   Procedure Laterality Date    MAMMO (HISTORICAL)  2019    [address] [facility] [address]    TOOTH EXTRACTION           Social History     Socioeconomic History    Marital status: /Civil Union     Spouse name: None    Number of children: None    Years of education: 4 year college    Highest education level: None   Occupational History    Occupation: Retail   Social Needs    Financial resource strain: None    Food insecurity     Worry: None     Inability: None    Transportation needs     Medical: None     Non-medical: None   Tobacco Use    Smoking status: Never Smoker    Smokeless tobacco: Never Used   Substance and Sexual Activity  Alcohol use:  Yes     Alcohol/week: 2 0 standard drinks     Types: 1 Glasses of wine, 1 Shots of liquor per week    Drug use: Never     Comment: Denies    Sexual activity: Yes     Partners: Male     Birth control/protection: Male Sterilization   Lifestyle    Physical activity     Days per week: None     Minutes per session: None    Stress: None   Relationships    Social connections     Talks on phone: None     Gets together: None     Attends Orthodox service: None     Active member of club or organization: None     Attends meetings of clubs or organizations: None     Relationship status: None    Intimate partner violence     Fear of current or ex partner: None     Emotionally abused: None     Physically abused: None     Forced sexual activity: None   Other Topics Concern    None   Social History Narrative    Most recent tobacco use screenin2020    Do you currently or have you served in the [employer]: No    Were you activated, into active duty, as a member of the [employer] or as a Reservist: No    Occupation: [employer]    Education: [address]    Marital status:     Sexual orientation: Heterosexual    Exercise level: Occasional    Diet: Regular    General stress level: Medium    Alcohol intake: None    Caffeine intake: Occasional    Chewing tobacco: none    Illicit drugs: Denied    Guns present in home: No    Seat belts used routinely: Yes    Sunscreen used routinely: Yes    Smoke alarm in home: Yes    Advance directive: Yes    Salt Intake: No added salt           History   Alcohol use: Not on file       History   Smoking Status    Not on file   Smokeless Tobacco    Not on file       Family History:   Family History   Problem Relation Age of Onset    Breast cancer Mother     Other Father         acute intermittent porphyria    Cancer Father     Heart attack Father     Leukemia Paternal Grandfather     Ovarian cancer Paternal Aunt         disseminated    Leukemia Paternal Uncle     Cancer Sister        Medications/Allergies:      Current Outpatient Medications:     cetirizine (ZyrTEC) 10 MG chewable tablet, Chew 10 mg as needed , Disp: , Rfl:     fexofenadine (ALLEGRA) 180 MG tablet, Take 180 mg by mouth daily, Disp: , Rfl:     fluocinonide (LIDEX) 0 05 % ointment, Apply topically 2 (two) times a day (Patient taking differently: Apply topically as needed ), Disp: 60 g, Rfl: 1    Multiple Vitamin (MULTIVITAMIN) capsule, Take by mouth, Disp: , Rfl:     Triamcinolone Acetonide (NASACORT ALLERGY 24HR) 55 MCG/ACT AERO, into each nostril as needed , Disp: , Rfl:       Objective    Vital Signs:   Vitals:    05/04/21 1343   BP: 110/76   Weight: 75 9 kg (167 lb 6 4 oz)   Height: 5' 7" (1 702 m)     [place] Sleepiness Scale: Total score: 9    Physical Exam:    General: Alert, appropriate, cooperative, overweight    Head: NC/AT    Skin: Warm, dry    Neuro: No motor abnormalities, cranial nerves appear intact    Psych: Normal affect            DENISHA Metzger    Board Certified Sleep Specialist

## 2021-05-07 ENCOUNTER — TELEPHONE (OUTPATIENT)
Dept: GASTROENTEROLOGY | Facility: CLINIC | Age: 46
End: 2021-05-07

## 2021-05-07 ENCOUNTER — PREP FOR PROCEDURE (OUTPATIENT)
Dept: GASTROENTEROLOGY | Facility: CLINIC | Age: 46
End: 2021-05-07

## 2021-05-07 DIAGNOSIS — Z12.11 SCREEN FOR COLON CANCER: Primary | ICD-10-CM

## 2021-05-07 NOTE — TELEPHONE ENCOUNTER
TC to patient to discuss referral for colon cancer screening  Presented options of colon/cologuard, discussed both  Patient is under age 47 and will call insurance to verify coverage  Patient is undecided which procedure to choose and took suggestion of speaking to nurse line at insurance to get their input as well  Patient in agreement and will call back  Patient called back and stated insurance will not cover as routine procedure until age 47, but they did encourage the colonoscopy vs cologuard for greater accuracy of findings  Provided patient [employer] phone number and CPT codes to determine how much it may cost so she may make the best decision for herself  Patient called back and scheduled colon for Thursday, 7/1/21, at [facility], with Dr Tianna Perry  Dulcolax/Miralax prep instructions reviewed with and mailed to patient's home

## 2021-06-22 ENCOUNTER — HOSPITAL ENCOUNTER (OUTPATIENT)
Dept: SLEEP CENTER | Facility: CLINIC | Age: 46
Discharge: HOME | End: 2021-06-22
Payer: COMMERCIAL

## 2021-06-22 DIAGNOSIS — G47.33 OSA (OBSTRUCTIVE SLEEP APNEA): ICD-10-CM

## 2021-06-22 PROCEDURE — 95810 POLYSOM 6/> YRS 4/> PARAM: CPT

## 2021-06-23 NOTE — PROGRESS NOTES
Sleep Study Documentation    Pre-Sleep Study       Sleep testing procedure explained to patient:YES    Patient napped prior to study:NO    Caffeine:Dayshift worker after 12PM   Caffeine use:YES- tea  6 ounces    Alcohol:Dayshift workers after 5PM: Alcohol use:NO    Typical day for patient:YES       Study Documentation    Sleep Study Indications: SNORING,     Sleep Study: Diagnostic   Snore:None  Supplemental O2: no        Minimum SaO2 94  Baseline SaO2 98      EKG abnormalities: no     EEG abnormalities: no    Sleep Study Recorded < 2 hours: N/A    Sleep Study Recorded > 2 hours but incomplete study: N/A    Sleep Study Recorded 6 hours but no sleep obtained: NO    Patient classification: employed       Post-Sleep Study    Medication used at bedtime or during sleep study:YES other prescription medications    Patient reports time it took to fall asleep:greater than 60 minutes    Patient reports waking up during study:3 or more times  Patient reports returning to sleep in 10 to 30 minutes  Patient reports sleeping 2 to 4 hours without dreaming  Patient reports sleep during study:worse than usual    Patient rated sleepiness: Somewhat sleepy or tired    PAP treatment:no

## 2021-06-28 ENCOUNTER — TELEPHONE (OUTPATIENT)
Dept: SLEEP CENTER | Facility: CLINIC | Age: 46
End: 2021-06-28

## 2021-06-30 ENCOUNTER — TELEPHONE (OUTPATIENT)
Dept: GASTROENTEROLOGY | Facility: AMBULARY SURGERY CENTER | Age: 46
End: 2021-06-30

## 2021-07-01 ENCOUNTER — ANESTHESIA EVENT (OUTPATIENT)
Dept: GASTROENTEROLOGY | Facility: AMBULARY SURGERY CENTER | Age: 46
End: 2021-07-01

## 2021-07-01 ENCOUNTER — HOSPITAL ENCOUNTER (OUTPATIENT)
Dept: GASTROENTEROLOGY | Facility: AMBULARY SURGERY CENTER | Age: 46
Setting detail: OUTPATIENT SURGERY
Discharge: HOME | End: 2021-07-01
Attending: INTERNAL MEDICINE | Admitting: INTERNAL MEDICINE
Payer: COMMERCIAL

## 2021-07-01 ENCOUNTER — ANESTHESIA (OUTPATIENT)
Dept: GASTROENTEROLOGY | Facility: AMBULARY SURGERY CENTER | Age: 46
End: 2021-07-01

## 2021-07-01 VITALS
RESPIRATION RATE: 16 BRPM | DIASTOLIC BLOOD PRESSURE: 71 MMHG | BODY MASS INDEX: 24.64 KG/M2 | SYSTOLIC BLOOD PRESSURE: 115 MMHG | OXYGEN SATURATION: 98 % | HEIGHT: 67 IN | TEMPERATURE: 97.6 F | HEART RATE: 77 BPM | WEIGHT: 157 LBS

## 2021-07-01 DIAGNOSIS — Z12.11 SCREEN FOR COLON CANCER: ICD-10-CM

## 2021-07-01 LAB
EXT PREGNANCY TEST URINE: NEGATIVE
EXT. CONTROL: NORMAL

## 2021-07-01 PROCEDURE — 88305 TISSUE EXAM BY PATHOLOGIST: CPT | Performed by: PATHOLOGY

## 2021-07-01 PROCEDURE — 45385 COLONOSCOPY W/LESION REMOVAL: CPT | Performed by: INTERNAL MEDICINE

## 2021-07-01 PROCEDURE — 81025 URINE PREGNANCY TEST: CPT | Performed by: INTERNAL MEDICINE

## 2021-07-01 RX ORDER — LIDOCAINE HYDROCHLORIDE 10 MG/ML
0.5 INJECTION, SOLUTION EPIDURAL; INFILTRATION; INTRACAUDAL; PERINEURAL ONCE AS NEEDED
Status: DISCONTINUED | OUTPATIENT
Start: 2021-07-01 | End: 2021-07-05 | Stop reason: HOSPADM

## 2021-07-01 RX ORDER — SODIUM CHLORIDE, SODIUM LACTATE, POTASSIUM CHLORIDE, CALCIUM CHLORIDE 600; 310; 30; 20 MG/100ML; MG/100ML; MG/100ML; MG/100ML
125 INJECTION, SOLUTION INTRAVENOUS CONTINUOUS
Status: DISCONTINUED | OUTPATIENT
Start: 2021-07-01 | End: 2021-07-05 | Stop reason: HOSPADM

## 2021-07-01 RX ORDER — PROPOFOL 10 MG/ML
INJECTION, EMULSION INTRAVENOUS CONTINUOUS PRN
Status: DISCONTINUED | OUTPATIENT
Start: 2021-07-01 | End: 2021-07-01

## 2021-07-01 RX ORDER — PROPOFOL 10 MG/ML
INJECTION, EMULSION INTRAVENOUS AS NEEDED
Status: DISCONTINUED | OUTPATIENT
Start: 2021-07-01 | End: 2021-07-01

## 2021-07-01 RX ORDER — SODIUM CHLORIDE, SODIUM LACTATE, POTASSIUM CHLORIDE, CALCIUM CHLORIDE 600; 310; 30; 20 MG/100ML; MG/100ML; MG/100ML; MG/100ML
INJECTION, SOLUTION INTRAVENOUS CONTINUOUS PRN
Status: DISCONTINUED | OUTPATIENT
Start: 2021-07-01 | End: 2021-07-01

## 2021-07-01 RX ADMIN — PROPOFOL 50 MG: 10 INJECTION, EMULSION INTRAVENOUS at 13:09

## 2021-07-01 RX ADMIN — PROPOFOL 150 MG: 10 INJECTION, EMULSION INTRAVENOUS at 13:07

## 2021-07-01 RX ADMIN — SODIUM CHLORIDE, SODIUM LACTATE, POTASSIUM CHLORIDE, AND CALCIUM CHLORIDE: .6; .31; .03; .02 INJECTION, SOLUTION INTRAVENOUS at 13:05

## 2021-07-01 RX ADMIN — PROPOFOL 140 MCG/KG/MIN: 10 INJECTION, EMULSION INTRAVENOUS at 13:09

## 2021-07-01 NOTE — ANESTHESIA POSTPROCEDURE EVALUATION
Post-Op Assessment Note    CV Status:  Stable    Pain management: adequate     Mental Status:  Alert and awake   Hydration Status:  Euvolemic   PONV Controlled:  Controlled   Airway Patency:  Patent      Post Op Vitals Reviewed: Yes      Staff: Anesthesiologist, CRNA         No complications documented      BP   133/66   Temp     Pulse  78   Resp   14   SpO2 98

## 2021-07-01 NOTE — ANESTHESIA PREPROCEDURE EVALUATION
Procedure:  COLONOSCOPY    Relevant Problems   ANESTHESIA  first anesthetic, no family history of issues      CARDIO   (-) Chest pain   (-) MORE (dyspnea on exertion)      GI/HEPATIC   (-) Gastroesophageal reflux disease      /RENAL (within normal limits)      NEURO/PSYCH   (+) Post-traumatic headache   (+) Postconcussion syndrome      PULMONARY   (+) Mild asthma (allergy and weather related)   (+) YULIET (obstructive sleep apnea) (in the process of testing)   (-) Shortness of breath   (-) URI (upper respiratory infection)        Physical Exam    Airway    Mallampati score: I  TM Distance: >3 FB  Neck ROM: full     Dental   No notable dental hx     Cardiovascular      Pulmonary      Other Findings        Anesthesia Plan  ASA Score- 2     Anesthesia Type- IV sedation with anesthesia with ASA Monitors  Additional Monitors:   Airway Plan:           Plan Factors-Exercise tolerance (METS): >4 METS  Chart reviewed  Existing labs reviewed  Induction- intravenous  Postoperative Plan-     Informed Consent- Anesthetic plan and risks discussed with patient  I personally reviewed this patient with the CRNA  Discussed and agreed on the Anesthesia Plan with the CRNA  Ivory Buckner

## 2021-07-01 NOTE — H&P
History and Physical -  [facility]  Regina Medinale 51 y o  female MRN: [id number]    HPI: Manan Harvey is a 51y o  year old female who presents with colon cancer screening  Review of Systems    Historical Information   No past medical history on file  Past Surgical History:   Procedure Laterality Date    MAMMO (HISTORICAL)  02/[address]    [address]    TOOTH EXTRACTION       Social History   Social History     Substance and Sexual Activity   Alcohol Use Yes    Alcohol/week: 2 0 standard drinks    Types: 1 Glasses of wine, 1 Shots of liquor per week     Social History     Substance and Sexual Activity   Drug Use Never    Comment: Denies     Social History     Tobacco Use   Smoking Status Never Smoker   Smokeless Tobacco Never Used     Family History   Problem Relation Age of Onset    Breast cancer Mother     Other Father         acute intermittent porphyria    Cancer Father     Heart attack Father     Leukemia Paternal Grandfather     Ovarian cancer Paternal Aunt         disseminated    Leukemia Paternal Uncle     Cancer Sister        Meds/Allergies     (Not in a hospital admission)      Allergies   Allergen Reactions    Keflex [Cephalexin]     Other Other (See Comments)     Allergic to lotions and cream bases- gold sodium thiosulfate, fragrance mix, coconut mix,coconut diethanolamide, ethylenediamine diahydrchloride, kathon CG, Phenylenediamine, Cats    Aspirin Rash       Objective     Ht 5' 7 75" (1 721 m)   Wt 71 2 kg (157 lb)   LMP 06/21/2021   BMI 24 05 kg/m²       PHYSICAL EXAM    Gen: NAD  CV: RRR  CHEST: Clear  ABD: soft, NT/ND  EXT: no edema  Neuro: AAO      ASSESSMENT/PLAN:  This is a 51y o  year old female here for colon cancer screening       PLAN:   Procedure: colonoscopy

## 2021-08-17 ENCOUNTER — HOSPITAL ENCOUNTER (OUTPATIENT)
Dept: MAMMOGRAPHY | Facility: HOSPITAL | Age: 46
Discharge: HOME | End: 2021-08-17
Attending: FAMILY MEDICINE
Payer: COMMERCIAL

## 2021-08-17 VITALS — HEIGHT: 67 IN | BODY MASS INDEX: 24.64 KG/M2 | WEIGHT: 157 LBS

## 2021-08-17 DIAGNOSIS — Z12.31 ENCOUNTER FOR SCREENING MAMMOGRAM FOR BREAST CANCER: ICD-10-CM

## 2021-08-17 PROCEDURE — 77063 BREAST TOMOSYNTHESIS BI: CPT

## 2021-08-17 PROCEDURE — 77067 SCR MAMMO BI INCL CAD: CPT

## 2021-10-06 ENCOUNTER — OFFICE VISIT (OUTPATIENT)
Dept: SLEEP CENTER | Facility: CLINIC | Age: 46
End: 2021-10-06
Payer: COMMERCIAL

## 2021-10-06 VITALS
BODY MASS INDEX: 25.74 KG/M2 | HEIGHT: 67 IN | HEART RATE: 87 BPM | SYSTOLIC BLOOD PRESSURE: 124 MMHG | WEIGHT: 164 LBS | DIASTOLIC BLOOD PRESSURE: 70 MMHG

## 2021-10-06 DIAGNOSIS — F51.04 PSYCHOPHYSIOLOGICAL INSOMNIA: Primary | ICD-10-CM

## 2021-10-06 PROCEDURE — 3008F BODY MASS INDEX DOCD: CPT | Performed by: INTERNAL MEDICINE

## 2021-10-06 PROCEDURE — 1036F TOBACCO NON-USER: CPT | Performed by: INTERNAL MEDICINE

## 2021-10-06 PROCEDURE — 99214 OFFICE O/P EST MOD 30 MIN: CPT | Performed by: INTERNAL MEDICINE

## 2021-10-06 RX ORDER — CETIRIZINE HYDROCHLORIDE 10 MG/1
10 TABLET ORAL DAILY
COMMUNITY

## 2021-10-19 ENCOUNTER — TELEPHONE (OUTPATIENT)
Dept: SLEEP CENTER | Facility: CLINIC | Age: 46
End: 2021-10-19

## 2022-01-31 ENCOUNTER — OFFICE VISIT (OUTPATIENT)
Dept: FAMILY MEDICINE CLINIC | Facility: CLINIC | Age: 47
End: 2022-01-31
Payer: COMMERCIAL

## 2022-01-31 VITALS
BODY MASS INDEX: 26.84 KG/M2 | DIASTOLIC BLOOD PRESSURE: 82 MMHG | HEIGHT: 67 IN | OXYGEN SATURATION: 99 % | HEART RATE: 98 BPM | SYSTOLIC BLOOD PRESSURE: 134 MMHG | RESPIRATION RATE: 16 BRPM | WEIGHT: 171 LBS

## 2022-01-31 DIAGNOSIS — R09.02 HYPOXEMIA: ICD-10-CM

## 2022-01-31 DIAGNOSIS — Z00.00 WELL ADULT EXAM: Primary | ICD-10-CM

## 2022-01-31 DIAGNOSIS — J34.2 DEVIATED NASAL SEPTUM: ICD-10-CM

## 2022-01-31 DIAGNOSIS — E55.9 VITAMIN D DEFICIENCY: ICD-10-CM

## 2022-01-31 DIAGNOSIS — J45.20 MILD INTERMITTENT ASTHMA, UNSPECIFIED WHETHER COMPLICATED: ICD-10-CM

## 2022-01-31 PROCEDURE — 99396 PREV VISIT EST AGE 40-64: CPT | Performed by: FAMILY MEDICINE

## 2022-01-31 NOTE — PROGRESS NOTES
Assessment/Plan: Junior 38K mi last year   No covid yet !   breathinghas been good   Not seeing sleep med   I thought about sending her back for the daytime hypoxia but she feels controled enough         1  Well adult exam  -     CBC and differential; Future  -     Comprehensive metabolic panel; Future  -     Lipid Panel with Direct LDL reflex; Future    2  Vitamin D deficiency  -     Vitamin D 25 hydroxy; Future    3  Mild intermittent asthma, unspecified whether complicated    4  Hypoxemia  Comments:  gets day time down to low 80% on her fitbit watch  feels it also   lasts for seconds   does have deviated septum and ? asthma     5  Deviated nasal septum    6  BMI 26 0-26 9,adult         Subjective:      Patient ID: Jolene Adler is a 51 y o  female  HPI    Here for yearly   Found out that putting her very long hair up in a bun worked to improve her sleep   As she was not pulling on it at night and causing her to wake her up   She does have HA from the long hair when it is in a bun   Colon due in 5yrs 5/21    Left knee pain worse with cold weather   Needs home Ex  The following portions of the patient's history were reviewed and updated as appropriate: allergies, current medications, past family history, past medical history, past social history, past surgical history and problem list     Review of Systems   Constitutional: Negative for fever and unexpected weight change  HENT: Negative for nosebleeds and trouble swallowing  Eyes: Negative for visual disturbance  Respiratory: Negative for chest tightness and shortness of breath  Cardiovascular: Negative for chest pain, palpitations and leg swelling  Gastrointestinal: Negative for abdominal pain, constipation, diarrhea and nausea  Endocrine: Negative for cold intolerance  Genitourinary: Negative for dysuria and urgency  Musculoskeletal: Positive for arthralgias  Negative for joint swelling and myalgias  Skin: Negative for rash  Neurological: Negative for tremors, seizures and syncope  Hematological: Does not bruise/bleed easily  Psychiatric/Behavioral: Positive for sleep disturbance  Negative for hallucinations and suicidal ideas  Objective:      /82 (BP Location: Right arm, Patient Position: Sitting, Cuff Size: Standard)   Pulse 98   Resp 16   Ht 5' 7" (1 702 m)   Wt 77 6 kg (171 lb)   SpO2 99%   BMI 26 78 kg/m²     No visits with results within 2 Week(s) from this visit  Latest known visit with results is:   Hospital Outpatient Visit on 07/01/2021   Component Date Value    EXT Preg Test, Ur 07/01/2021 Negative     Control 07/01/2021 Valid     Case Report 07/01/2021                      Value:Surgical Pathology Report                         Case: [id number]                                   Authorizing Provider:  Carol Beckett MD            Collected:           07/01/2021 1317              Ordering Location:     [address] [place] [address]        Received:            07/01/2021 2101                                     [facility]                                                    Pathologist:           Freddie Petit MD                                                        Specimen:    Polyp, Colorectal, Cold snare polypectomy ascending                                        Final Diagnosis 07/01/2021                      Value: This result contains rich text formatting which cannot be displayed here   Additional Information 07/01/2021                      Value: This result contains rich text formatting which cannot be displayed here   Synoptic Checklist 07/01/2021                      Value:  (COLON/RECTUM POLYP FORM - GI - All Specimens)                                                                                                                 : Adenoma(s)     Raegan Cade Description 07/01/2021                      Value: This result contains rich text formatting which cannot be displayed here           Physical Exam  Vitals and nursing note reviewed  Constitutional:       Appearance: She is well-developed  HENT:      Head: Normocephalic and atraumatic  Cardiovascular:      Rate and Rhythm: Normal rate and regular rhythm  Heart sounds: Normal heart sounds  No murmur heard  Pulmonary:      Effort: Pulmonary effort is normal       Breath sounds: Normal breath sounds  No wheezing or rales  Abdominal:      General: Bowel sounds are normal  There is no distension  Palpations: Abdomen is soft  Tenderness: There is no abdominal tenderness  Musculoskeletal:         General: No tenderness  Normal range of motion  Cervical back: Normal range of motion and neck supple  Lymphadenopathy:      Cervical: No cervical adenopathy  Skin:     General: Skin is warm and dry  Capillary Refill: Capillary refill takes less than 2 seconds  Findings: No rash  Neurological:      Mental Status: She is alert and oriented to person, place, and time  Cranial Nerves: No cranial nerve deficit  Sensory: No sensory deficit  Motor: No abnormal muscle tone  Psychiatric:         Behavior: Behavior normal          Thought Content: Thought content normal          Judgment: Judgment normal            BMI Counseling: Body mass index is 26 78 kg/m²  The BMI is above normal  Nutrition recommendations include decreasing portion sizes, encouraging healthy choices of fruits and vegetables and limiting drinks that contain sugar  Exercise recommendations include moderate physical activity 150 minutes/week  No pharmacotherapy was ordered  Rationale for BMI follow-up plan is due to patient being overweight or obese  Depression Screening and Follow-up Plan: Patient was screened for depression during today's encounter   They screened negative with a PHQ-2 score of 0       Lori Lynch MD  [place] [id number]

## 2022-02-04 ENCOUNTER — APPOINTMENT (OUTPATIENT)
Dept: LAB | Facility: CLINIC | Age: 47
End: 2022-02-04
Payer: COMMERCIAL

## 2022-02-04 DIAGNOSIS — Z00.00 WELL ADULT EXAM: ICD-10-CM

## 2022-02-04 DIAGNOSIS — E55.9 VITAMIN D DEFICIENCY: ICD-10-CM

## 2022-02-04 LAB
25(OH)D3 SERPL-MCNC: 17.6 NG/ML (ref 30–100)
ALBUMIN SERPL BCP-MCNC: 3.9 G/DL (ref 3.5–5)
ALP SERPL-CCNC: 58 U/L (ref 46–116)
ALT SERPL W P-5'-P-CCNC: 23 U/L (ref 12–78)
ANION GAP SERPL CALCULATED.3IONS-SCNC: 4 MMOL/L (ref 4–13)
AST SERPL W P-5'-P-CCNC: 12 U/L (ref 5–45)
BASOPHILS # BLD AUTO: 0.04 THOUSANDS/ΜL (ref 0–0.1)
BASOPHILS NFR BLD AUTO: 1 % (ref 0–1)
BILIRUB SERPL-MCNC: 1.47 MG/DL (ref 0.2–1)
BUN SERPL-MCNC: 8 MG/DL (ref 5–25)
CALCIUM SERPL-MCNC: 9.1 MG/DL (ref 8.3–10.1)
CHLORIDE SERPL-SCNC: 106 MMOL/L (ref 100–108)
CHOLEST SERPL-MCNC: 236 MG/DL
CO2 SERPL-SCNC: 27 MMOL/L (ref 21–32)
CREAT SERPL-MCNC: 0.72 MG/DL (ref 0.6–1.3)
EOSINOPHIL # BLD AUTO: 0.2 THOUSAND/ΜL (ref 0–0.61)
EOSINOPHIL NFR BLD AUTO: 3 % (ref 0–6)
ERYTHROCYTE [DISTWIDTH] IN BLOOD BY AUTOMATED COUNT: 11.1 % (ref 11.6–15.1)
GFR SERPL CREATININE-BSD FRML MDRD: 100 ML/MIN/1.73SQ M
GLUCOSE P FAST SERPL-MCNC: 91 MG/DL (ref 65–99)
HCT VFR BLD AUTO: 42.4 % (ref 34.8–46.1)
HDLC SERPL-MCNC: 52 MG/DL
HGB BLD-MCNC: 14.3 G/DL (ref 11.5–15.4)
IMM GRANULOCYTES # BLD AUTO: 0.05 THOUSAND/UL (ref 0–0.2)
IMM GRANULOCYTES NFR BLD AUTO: 1 % (ref 0–2)
LDLC SERPL CALC-MCNC: 162 MG/DL (ref 0–100)
LYMPHOCYTES # BLD AUTO: 1.23 THOUSANDS/ΜL (ref 0.6–4.47)
LYMPHOCYTES NFR BLD AUTO: 19 % (ref 14–44)
MCH RBC QN AUTO: 32.5 PG (ref 26.8–34.3)
MCHC RBC AUTO-ENTMCNC: 33.7 G/DL (ref 31.4–37.4)
MCV RBC AUTO: 96 FL (ref 82–98)
MONOCYTES # BLD AUTO: 0.7 THOUSAND/ΜL (ref 0.17–1.22)
MONOCYTES NFR BLD AUTO: 11 % (ref 4–12)
NEUTROPHILS # BLD AUTO: 4.35 THOUSANDS/ΜL (ref 1.85–7.62)
NEUTS SEG NFR BLD AUTO: 65 % (ref 43–75)
NRBC BLD AUTO-RTO: 0 /100 WBCS
PLATELET # BLD AUTO: 350 THOUSANDS/UL (ref 149–390)
PMV BLD AUTO: 9.9 FL (ref 8.9–12.7)
POTASSIUM SERPL-SCNC: 3.8 MMOL/L (ref 3.5–5.3)
PROT SERPL-MCNC: 7.4 G/DL (ref 6.4–8.2)
RBC # BLD AUTO: 4.4 MILLION/UL (ref 3.81–5.12)
SODIUM SERPL-SCNC: 137 MMOL/L (ref 136–145)
TRIGL SERPL-MCNC: 110 MG/DL
WBC # BLD AUTO: 6.57 THOUSAND/UL (ref 4.31–10.16)

## 2022-02-04 PROCEDURE — 85025 COMPLETE CBC W/AUTO DIFF WBC: CPT

## 2022-02-04 PROCEDURE — 80061 LIPID PANEL: CPT

## 2022-02-04 PROCEDURE — 36415 COLL VENOUS BLD VENIPUNCTURE: CPT

## 2022-02-04 PROCEDURE — 80053 COMPREHEN METABOLIC PANEL: CPT

## 2022-02-04 PROCEDURE — 82306 VITAMIN D 25 HYDROXY: CPT
